# Patient Record
Sex: FEMALE | Race: WHITE | NOT HISPANIC OR LATINO | Employment: OTHER | URBAN - NONMETROPOLITAN AREA
[De-identification: names, ages, dates, MRNs, and addresses within clinical notes are randomized per-mention and may not be internally consistent; named-entity substitution may affect disease eponyms.]

---

## 2020-07-17 ENCOUNTER — TRANSCRIBE ORDERS (OUTPATIENT)
Dept: ADMINISTRATIVE | Facility: HOSPITAL | Age: 60
End: 2020-07-17

## 2020-07-17 DIAGNOSIS — Z01.818 PREOP TESTING: Primary | ICD-10-CM

## 2020-07-23 ENCOUNTER — LAB (OUTPATIENT)
Dept: LAB | Facility: HOSPITAL | Age: 60
End: 2020-07-23

## 2020-07-23 PROCEDURE — U0003 INFECTIOUS AGENT DETECTION BY NUCLEIC ACID (DNA OR RNA); SEVERE ACUTE RESPIRATORY SYNDROME CORONAVIRUS 2 (SARS-COV-2) (CORONAVIRUS DISEASE [COVID-19]), AMPLIFIED PROBE TECHNIQUE, MAKING USE OF HIGH THROUGHPUT TECHNOLOGIES AS DESCRIBED BY CMS-2020-01-R: HCPCS | Performed by: PAIN MEDICINE

## 2020-07-23 PROCEDURE — C9803 HOPD COVID-19 SPEC COLLECT: HCPCS | Performed by: PAIN MEDICINE

## 2020-07-24 LAB
COVID LABCORP PRIORITY: NORMAL
SARS-COV-2 RNA RESP QL NAA+PROBE: NOT DETECTED

## 2020-10-22 ENCOUNTER — TRANSCRIBE ORDERS (OUTPATIENT)
Dept: ADMINISTRATIVE | Facility: HOSPITAL | Age: 60
End: 2020-10-22

## 2020-10-22 DIAGNOSIS — Z01.818 PREOP TESTING: Primary | ICD-10-CM

## 2020-10-24 ENCOUNTER — LAB (OUTPATIENT)
Dept: LAB | Facility: HOSPITAL | Age: 60
End: 2020-10-24

## 2020-10-24 PROCEDURE — U0003 INFECTIOUS AGENT DETECTION BY NUCLEIC ACID (DNA OR RNA); SEVERE ACUTE RESPIRATORY SYNDROME CORONAVIRUS 2 (SARS-COV-2) (CORONAVIRUS DISEASE [COVID-19]), AMPLIFIED PROBE TECHNIQUE, MAKING USE OF HIGH THROUGHPUT TECHNOLOGIES AS DESCRIBED BY CMS-2020-01-R: HCPCS | Performed by: PAIN MEDICINE

## 2020-10-24 PROCEDURE — C9803 HOPD COVID-19 SPEC COLLECT: HCPCS | Performed by: PAIN MEDICINE

## 2020-10-25 LAB
COVID LABCORP PRIORITY: NORMAL
SARS-COV-2 RNA RESP QL NAA+PROBE: NOT DETECTED

## 2021-01-01 ENCOUNTER — HOSPITAL ENCOUNTER (INPATIENT)
Age: 61
LOS: 3 days | DRG: 137 | End: 2021-09-04
Attending: EMERGENCY MEDICINE | Admitting: INTERNAL MEDICINE
Payer: COMMERCIAL

## 2021-01-01 ENCOUNTER — APPOINTMENT (OUTPATIENT)
Dept: GENERAL RADIOLOGY | Age: 61
DRG: 137 | End: 2021-01-01
Payer: COMMERCIAL

## 2021-01-01 VITALS
SYSTOLIC BLOOD PRESSURE: 122 MMHG | OXYGEN SATURATION: 68 % | TEMPERATURE: 97.8 F | HEIGHT: 65 IN | BODY MASS INDEX: 48.82 KG/M2 | RESPIRATION RATE: 28 BRPM | WEIGHT: 293 LBS | HEART RATE: 125 BPM | DIASTOLIC BLOOD PRESSURE: 70 MMHG

## 2021-01-01 DIAGNOSIS — J96.01 ACUTE HYPOXEMIC RESPIRATORY FAILURE DUE TO SEVERE ACUTE RESPIRATORY SYNDROME CORONAVIRUS 2 (SARS-COV-2) DISEASE (HCC): Primary | ICD-10-CM

## 2021-01-01 DIAGNOSIS — E87.20 ACIDOSIS: ICD-10-CM

## 2021-01-01 DIAGNOSIS — J12.82 PNEUMONIA DUE TO COVID-19 VIRUS: ICD-10-CM

## 2021-01-01 DIAGNOSIS — U07.1 ACUTE HYPOXEMIC RESPIRATORY FAILURE DUE TO SEVERE ACUTE RESPIRATORY SYNDROME CORONAVIRUS 2 (SARS-COV-2) DISEASE (HCC): Primary | ICD-10-CM

## 2021-01-01 DIAGNOSIS — U07.1 PNEUMONIA DUE TO COVID-19 VIRUS: ICD-10-CM

## 2021-01-01 DIAGNOSIS — E66.9 OBESITY, UNSPECIFIED CLASSIFICATION, UNSPECIFIED OBESITY TYPE, UNSPECIFIED WHETHER SERIOUS COMORBIDITY PRESENT: ICD-10-CM

## 2021-01-01 LAB
ADENOVIRUS BY PCR: NOT DETECTED
ALBUMIN SERPL-MCNC: 2.8 G/DL (ref 3.5–5.2)
ALBUMIN SERPL-MCNC: 3 G/DL (ref 3.5–5.2)
ALBUMIN SERPL-MCNC: 3.1 G/DL (ref 3.5–5.2)
ALBUMIN SERPL-MCNC: 3.4 G/DL (ref 3.5–5.2)
ALP BLD-CCNC: 132 U/L (ref 35–104)
ALP BLD-CCNC: 134 U/L (ref 35–104)
ALP BLD-CCNC: 137 U/L (ref 35–104)
ALP BLD-CCNC: 139 U/L (ref 35–104)
ALT SERPL-CCNC: 37 U/L (ref 5–33)
ALT SERPL-CCNC: 55 U/L (ref 5–33)
ALT SERPL-CCNC: 57 U/L (ref 5–33)
ALT SERPL-CCNC: 73 U/L (ref 5–33)
ANION GAP SERPL CALCULATED.3IONS-SCNC: 12 MMOL/L (ref 7–19)
ANION GAP SERPL CALCULATED.3IONS-SCNC: 15 MMOL/L (ref 7–19)
ANION GAP SERPL CALCULATED.3IONS-SCNC: 19 MMOL/L (ref 7–19)
ANION GAP SERPL CALCULATED.3IONS-SCNC: 24 MMOL/L (ref 7–19)
ANISOCYTOSIS: ABNORMAL
ANISOCYTOSIS: ABNORMAL
AST SERPL-CCNC: 111 U/L (ref 5–32)
AST SERPL-CCNC: 124 U/L (ref 5–32)
AST SERPL-CCNC: 215 U/L (ref 5–32)
AST SERPL-CCNC: 237 U/L (ref 5–32)
BACTERIA: NEGATIVE /HPF
BANDED NEUTROPHILS RELATIVE PERCENT: 17 % (ref 0–5)
BASE EXCESS ARTERIAL: -13 MMOL/L (ref -2–2)
BASE EXCESS ARTERIAL: -13.4 MMOL/L (ref -2–2)
BASE EXCESS ARTERIAL: -13.6 MMOL/L (ref -2–2)
BASE EXCESS ARTERIAL: -2.7 MMOL/L (ref -2–2)
BASE EXCESS ARTERIAL: -4.8 MMOL/L (ref -2–2)
BASOPHILS ABSOLUTE: 0 K/UL (ref 0–0.2)
BASOPHILS RELATIVE PERCENT: 0 % (ref 0–1)
BASOPHILS RELATIVE PERCENT: 0.1 % (ref 0–1)
BILIRUB SERPL-MCNC: 0.3 MG/DL (ref 0.2–1.2)
BILIRUB SERPL-MCNC: 0.4 MG/DL (ref 0.2–1.2)
BILIRUB SERPL-MCNC: 0.4 MG/DL (ref 0.2–1.2)
BILIRUB SERPL-MCNC: 0.5 MG/DL (ref 0.2–1.2)
BILIRUBIN URINE: NEGATIVE
BLOOD, URINE: ABNORMAL
BORDETELLA PARAPERTUSSIS BY PCR: NOT DETECTED
BORDETELLA PERTUSSIS BY PCR: NOT DETECTED
BUN BLDV-MCNC: 12 MG/DL (ref 6–20)
BUN BLDV-MCNC: 20 MG/DL (ref 6–20)
BUN BLDV-MCNC: 21 MG/DL (ref 6–20)
BUN BLDV-MCNC: 26 MG/DL (ref 6–20)
BURR CELLS: ABNORMAL
BURR CELLS: ABNORMAL
C-REACTIVE PROTEIN: 13.92 MG/DL (ref 0–0.5)
C-REACTIVE PROTEIN: 23.99 MG/DL (ref 0–0.5)
C-REACTIVE PROTEIN: 26.02 MG/DL (ref 0–0.5)
C-REACTIVE PROTEIN: 6.03 MG/DL (ref 0–0.5)
CALCIUM SERPL-MCNC: 7.9 MG/DL (ref 8.6–10)
CALCIUM SERPL-MCNC: 8.3 MG/DL (ref 8.6–10)
CALCIUM SERPL-MCNC: 8.4 MG/DL (ref 8.6–10)
CALCIUM SERPL-MCNC: 8.6 MG/DL (ref 8.6–10)
CARBOXYHEMOGLOBIN ARTERIAL: 1.2 % (ref 0–5)
CARBOXYHEMOGLOBIN ARTERIAL: 1.5 % (ref 0–5)
CARBOXYHEMOGLOBIN ARTERIAL: 1.6 % (ref 0–5)
CARBOXYHEMOGLOBIN ARTERIAL: 2.2 % (ref 0–5)
CARBOXYHEMOGLOBIN ARTERIAL: 2.5 % (ref 0–5)
CHLAMYDOPHILIA PNEUMONIAE BY PCR: NOT DETECTED
CHLORIDE BLD-SCNC: 102 MMOL/L (ref 98–111)
CHLORIDE BLD-SCNC: 108 MMOL/L (ref 98–111)
CHLORIDE BLD-SCNC: 110 MMOL/L (ref 98–111)
CHLORIDE BLD-SCNC: 97 MMOL/L (ref 98–111)
CLARITY: CLEAR
CO2: 12 MMOL/L (ref 22–29)
CO2: 12 MMOL/L (ref 22–29)
CO2: 20 MMOL/L (ref 22–29)
CO2: 21 MMOL/L (ref 22–29)
COLOR: YELLOW
CORONAVIRUS 229E BY PCR: NOT DETECTED
CORONAVIRUS HKU1 BY PCR: NOT DETECTED
CORONAVIRUS NL63 BY PCR: NOT DETECTED
CORONAVIRUS OC43 BY PCR: NOT DETECTED
CREAT SERPL-MCNC: 0.8 MG/DL (ref 0.5–0.9)
CREAT SERPL-MCNC: 0.8 MG/DL (ref 0.5–0.9)
CREAT SERPL-MCNC: 0.9 MG/DL (ref 0.5–0.9)
CREAT SERPL-MCNC: 1 MG/DL (ref 0.5–0.9)
CRYSTALS, UA: ABNORMAL /HPF
D DIMER: 2.04 UG/ML FEU (ref 0–0.48)
D DIMER: 3.42 UG/ML FEU (ref 0–0.48)
D DIMER: 7.09 UG/ML FEU (ref 0–0.48)
D DIMER: 9.22 UG/ML FEU (ref 0–0.48)
EOSINOPHILS ABSOLUTE: 0 K/UL (ref 0–0.6)
EOSINOPHILS RELATIVE PERCENT: 0 % (ref 0–5)
EPITHELIAL CELLS, UA: 1 /HPF (ref 0–5)
FERRITIN: 647.4 NG/ML (ref 13–150)
FIBRINOGEN: 368 MG/DL (ref 238–505)
FIBRINOGEN: 531 MG/DL (ref 238–505)
FIBRINOGEN: 581 MG/DL (ref 238–505)
GFR AFRICAN AMERICAN: >59
GFR NON-AFRICAN AMERICAN: 57
GFR NON-AFRICAN AMERICAN: >60
GLUCOSE BLD-MCNC: 163 MG/DL (ref 70–99)
GLUCOSE BLD-MCNC: 168 MG/DL (ref 70–99)
GLUCOSE BLD-MCNC: 168 MG/DL (ref 70–99)
GLUCOSE BLD-MCNC: 171 MG/DL (ref 70–99)
GLUCOSE BLD-MCNC: 173 MG/DL (ref 70–99)
GLUCOSE BLD-MCNC: 188 MG/DL (ref 70–99)
GLUCOSE BLD-MCNC: 197 MG/DL (ref 70–99)
GLUCOSE BLD-MCNC: 198 MG/DL (ref 74–109)
GLUCOSE BLD-MCNC: 206 MG/DL (ref 74–109)
GLUCOSE BLD-MCNC: 215 MG/DL (ref 74–109)
GLUCOSE BLD-MCNC: 223 MG/DL (ref 70–99)
GLUCOSE BLD-MCNC: 249 MG/DL (ref 70–99)
GLUCOSE BLD-MCNC: 282 MG/DL (ref 74–109)
GLUCOSE URINE: =>1000 MG/DL
HCO3 ARTERIAL: 12.6 MMOL/L (ref 22–26)
HCO3 ARTERIAL: 15 MMOL/L (ref 22–26)
HCO3 ARTERIAL: 16 MMOL/L (ref 22–26)
HCO3 ARTERIAL: 22.1 MMOL/L (ref 22–26)
HCO3 ARTERIAL: 24.5 MMOL/L (ref 22–26)
HCT VFR BLD CALC: 42.9 % (ref 37–47)
HCT VFR BLD CALC: 44.2 % (ref 37–47)
HCT VFR BLD CALC: 45 % (ref 37–47)
HCT VFR BLD CALC: 45.7 % (ref 37–47)
HEMOGLOBIN, ART, EXTENDED: 13 G/DL (ref 12–16)
HEMOGLOBIN, ART, EXTENDED: 13 G/DL (ref 12–16)
HEMOGLOBIN, ART, EXTENDED: 13.3 G/DL (ref 12–16)
HEMOGLOBIN, ART, EXTENDED: 13.4 G/DL (ref 12–16)
HEMOGLOBIN, ART, EXTENDED: 14 G/DL (ref 12–16)
HEMOGLOBIN: 12.4 G/DL (ref 12–16)
HEMOGLOBIN: 12.9 G/DL (ref 12–16)
HEMOGLOBIN: 13 G/DL (ref 12–16)
HEMOGLOBIN: 13.2 G/DL (ref 12–16)
HUMAN METAPNEUMOVIRUS BY PCR: NOT DETECTED
HUMAN RHINOVIRUS/ENTEROVIRUS BY PCR: NOT DETECTED
HYALINE CASTS: 7 /HPF (ref 0–8)
HYPOCHROMIA: ABNORMAL
IMMATURE GRANULOCYTES #: 0.1 K/UL
INFLUENZA A BY PCR: NOT DETECTED
INFLUENZA B BY PCR: NOT DETECTED
INR BLD: 0.97 (ref 0.88–1.18)
INR BLD: 1.03 (ref 0.88–1.18)
INR BLD: 1.1 (ref 0.88–1.18)
INR BLD: 1.19 (ref 0.88–1.18)
KETONES, URINE: =>160 MG/DL
LACTIC ACID: 4.2 MMOL/L (ref 0.5–1.9)
LEUKOCYTE ESTERASE, URINE: NEGATIVE
LIPASE: 11 U/L (ref 13–60)
LV EF: 28 %
LVEF MODALITY: NORMAL
LYMPHOCYTES ABSOLUTE: 0.6 K/UL (ref 1.1–4.5)
LYMPHOCYTES ABSOLUTE: 0.7 K/UL (ref 1.1–4.5)
LYMPHOCYTES ABSOLUTE: 0.8 K/UL (ref 1.1–4.5)
LYMPHOCYTES ABSOLUTE: 1 K/UL (ref 1.1–4.5)
LYMPHOCYTES RELATIVE PERCENT: 12.2 % (ref 20–40)
LYMPHOCYTES RELATIVE PERCENT: 5 % (ref 20–40)
LYMPHOCYTES RELATIVE PERCENT: 5.5 % (ref 20–40)
LYMPHOCYTES RELATIVE PERCENT: 7.5 % (ref 20–40)
MACROCYTES: ABNORMAL
MAGNESIUM: 2.6 MG/DL (ref 1.6–2.6)
MAGNESIUM: 2.8 MG/DL (ref 1.6–2.6)
MCH RBC QN AUTO: 26.5 PG (ref 27–31)
MCH RBC QN AUTO: 26.6 PG (ref 27–31)
MCH RBC QN AUTO: 27.3 PG (ref 27–31)
MCH RBC QN AUTO: 27.4 PG (ref 27–31)
MCHC RBC AUTO-ENTMCNC: 28.7 G/DL (ref 33–37)
MCHC RBC AUTO-ENTMCNC: 28.9 G/DL (ref 33–37)
MCHC RBC AUTO-ENTMCNC: 28.9 G/DL (ref 33–37)
MCHC RBC AUTO-ENTMCNC: 29.4 G/DL (ref 33–37)
MCV RBC AUTO: 92.1 FL (ref 81–99)
MCV RBC AUTO: 92.4 FL (ref 81–99)
MCV RBC AUTO: 92.9 FL (ref 81–99)
MCV RBC AUTO: 95 FL (ref 81–99)
METHEMOGLOBIN ARTERIAL: 0.8 %
METHEMOGLOBIN ARTERIAL: 0.9 %
METHEMOGLOBIN ARTERIAL: 1 %
MONOCYTES ABSOLUTE: 0.3 K/UL (ref 0–0.9)
MONOCYTES ABSOLUTE: 0.4 K/UL (ref 0–0.9)
MONOCYTES ABSOLUTE: 0.5 K/UL (ref 0–0.9)
MONOCYTES ABSOLUTE: 0.5 K/UL (ref 0–0.9)
MONOCYTES RELATIVE PERCENT: 2 % (ref 0–10)
MONOCYTES RELATIVE PERCENT: 4.4 % (ref 0–10)
MONOCYTES RELATIVE PERCENT: 4.7 % (ref 0–10)
MONOCYTES RELATIVE PERCENT: 5 % (ref 0–10)
MYCOPLASMA PNEUMONIAE BY PCR: NOT DETECTED
NEUTROPHILS ABSOLUTE: 12.1 K/UL (ref 1.5–7.5)
NEUTROPHILS ABSOLUTE: 6.8 K/UL (ref 1.5–7.5)
NEUTROPHILS ABSOLUTE: 9.2 K/UL (ref 1.5–7.5)
NEUTROPHILS ABSOLUTE: 9.8 K/UL (ref 1.5–7.5)
NEUTROPHILS RELATIVE PERCENT: 76 % (ref 50–65)
NEUTROPHILS RELATIVE PERCENT: 82.1 % (ref 50–65)
NEUTROPHILS RELATIVE PERCENT: 87.2 % (ref 50–65)
NEUTROPHILS RELATIVE PERCENT: 88.4 % (ref 50–65)
NITRITE, URINE: NEGATIVE
O2 CONTENT ARTERIAL: 14.9 ML/DL
O2 CONTENT ARTERIAL: 15.2 ML/DL
O2 CONTENT ARTERIAL: 16.4 ML/DL
O2 CONTENT ARTERIAL: 16.6 ML/DL
O2 CONTENT ARTERIAL: 17.2 ML/DL
O2 SAT, ARTERIAL: 81.3 %
O2 SAT, ARTERIAL: 81.4 %
O2 SAT, ARTERIAL: 83.5 %
O2 SAT, ARTERIAL: 90.6 %
O2 SAT, ARTERIAL: 91.4 %
O2 THERAPY: ABNORMAL
OVALOCYTES: ABNORMAL
OVALOCYTES: ABNORMAL
PARAINFLUENZA VIRUS 1 BY PCR: NOT DETECTED
PARAINFLUENZA VIRUS 2 BY PCR: NOT DETECTED
PARAINFLUENZA VIRUS 3 BY PCR: NOT DETECTED
PARAINFLUENZA VIRUS 4 BY PCR: NOT DETECTED
PCO2 ARTERIAL: 30 MMHG (ref 35–45)
PCO2 ARTERIAL: 43 MMHG (ref 35–45)
PCO2 ARTERIAL: 47 MMHG (ref 35–45)
PCO2 ARTERIAL: 48 MMHG (ref 35–45)
PCO2 ARTERIAL: 51 MMHG (ref 35–45)
PDW BLD-RTO: 17.3 % (ref 11.5–14.5)
PDW BLD-RTO: 17.3 % (ref 11.5–14.5)
PDW BLD-RTO: 17.6 % (ref 11.5–14.5)
PDW BLD-RTO: 17.6 % (ref 11.5–14.5)
PERFORMED ON: ABNORMAL
PH ARTERIAL: 7.13 (ref 7.35–7.45)
PH ARTERIAL: 7.15 (ref 7.35–7.45)
PH ARTERIAL: 7.23 (ref 7.35–7.45)
PH ARTERIAL: 7.28 (ref 7.35–7.45)
PH ARTERIAL: 7.29 (ref 7.35–7.45)
PH UA: 5.5 (ref 5–8)
PLATELET # BLD: 158 K/UL (ref 130–400)
PLATELET # BLD: 160 K/UL (ref 130–400)
PLATELET # BLD: 168 K/UL (ref 130–400)
PLATELET # BLD: 168 K/UL (ref 130–400)
PLATELET SLIDE REVIEW: ADEQUATE
PMV BLD AUTO: 11.1 FL (ref 9.4–12.3)
PMV BLD AUTO: 11.5 FL (ref 9.4–12.3)
PMV BLD AUTO: 11.7 FL (ref 9.4–12.3)
PMV BLD AUTO: 11.9 FL (ref 9.4–12.3)
PO2 ARTERIAL: 50 MMHG (ref 80–100)
PO2 ARTERIAL: 51 MMHG (ref 80–100)
PO2 ARTERIAL: 55 MMHG (ref 80–100)
PO2 ARTERIAL: 65 MMHG (ref 80–100)
PO2 ARTERIAL: 65 MMHG (ref 80–100)
POLYCHROMASIA: ABNORMAL
POTASSIUM SERPL-SCNC: 4.2 MMOL/L (ref 3.5–5)
POTASSIUM SERPL-SCNC: 4.7 MMOL/L (ref 3.5–5)
POTASSIUM SERPL-SCNC: 4.7 MMOL/L (ref 3.5–5)
POTASSIUM SERPL-SCNC: 5 MMOL/L (ref 3.5–5)
POTASSIUM, WHOLE BLOOD: 4.1
POTASSIUM, WHOLE BLOOD: 4.3
POTASSIUM, WHOLE BLOOD: 4.4
POTASSIUM, WHOLE BLOOD: 4.6
POTASSIUM, WHOLE BLOOD: 4.8
PRO-BNP: 2669 PG/ML (ref 0–900)
PROCALCITONIN: 0.86 NG/ML (ref 0–0.09)
PROTEIN UA: 300 MG/DL
PROTHROMBIN TIME: 13.1 SEC (ref 12–14.6)
PROTHROMBIN TIME: 13.7 SEC (ref 12–14.6)
PROTHROMBIN TIME: 14.4 SEC (ref 12–14.6)
PROTHROMBIN TIME: 15.3 SEC (ref 12–14.6)
RBC # BLD: 4.66 M/UL (ref 4.2–5.4)
RBC # BLD: 4.76 M/UL (ref 4.2–5.4)
RBC # BLD: 4.81 M/UL (ref 4.2–5.4)
RBC # BLD: 4.87 M/UL (ref 4.2–5.4)
RBC UA: 4 /HPF (ref 0–4)
REASON FOR REJECTION: NORMAL
REJECTED TEST: NORMAL
RESPIRATORY SYNCYTIAL VIRUS BY PCR: NOT DETECTED
SARS-COV-2, PCR: DETECTED
SODIUM BLD-SCNC: 133 MMOL/L (ref 136–145)
SODIUM BLD-SCNC: 133 MMOL/L (ref 136–145)
SODIUM BLD-SCNC: 143 MMOL/L (ref 136–145)
SODIUM BLD-SCNC: 143 MMOL/L (ref 136–145)
SPECIFIC GRAVITY UA: 1.03 (ref 1–1.03)
TEAR DROP CELLS: ABNORMAL
TOTAL PROTEIN: 6.7 G/DL (ref 6.6–8.7)
TOTAL PROTEIN: 6.7 G/DL (ref 6.6–8.7)
TOTAL PROTEIN: 6.8 G/DL (ref 6.6–8.7)
TOTAL PROTEIN: 7.3 G/DL (ref 6.6–8.7)
TROPONIN: 0.36 NG/ML (ref 0–0.03)
UROBILINOGEN, URINE: 0.2 E.U./DL
WBC # BLD: 10.6 K/UL (ref 4.8–10.8)
WBC # BLD: 11.1 K/UL (ref 4.8–10.8)
WBC # BLD: 13 K/UL (ref 4.8–10.8)
WBC # BLD: 8.3 K/UL (ref 4.8–10.8)
WBC UA: 1 /HPF (ref 0–5)

## 2021-01-01 PROCEDURE — 6370000000 HC RX 637 (ALT 250 FOR IP): Performed by: INTERNAL MEDICINE

## 2021-01-01 PROCEDURE — 2580000003 HC RX 258: Performed by: INTERNAL MEDICINE

## 2021-01-01 PROCEDURE — 6360000002 HC RX W HCPCS: Performed by: INTERNAL MEDICINE

## 2021-01-01 PROCEDURE — 6360000002 HC RX W HCPCS: Performed by: EMERGENCY MEDICINE

## 2021-01-01 PROCEDURE — 02HV33Z INSERTION OF INFUSION DEVICE INTO SUPERIOR VENA CAVA, PERCUTANEOUS APPROACH: ICD-10-PCS | Performed by: INTERNAL MEDICINE

## 2021-01-01 PROCEDURE — 84132 ASSAY OF SERUM POTASSIUM: CPT

## 2021-01-01 PROCEDURE — 71045 X-RAY EXAM CHEST 1 VIEW: CPT

## 2021-01-01 PROCEDURE — 2500000003 HC RX 250 WO HCPCS: Performed by: INTERNAL MEDICINE

## 2021-01-01 PROCEDURE — 83880 ASSAY OF NATRIURETIC PEPTIDE: CPT

## 2021-01-01 PROCEDURE — 94002 VENT MGMT INPAT INIT DAY: CPT

## 2021-01-01 PROCEDURE — 85025 COMPLETE CBC W/AUTO DIFF WBC: CPT

## 2021-01-01 PROCEDURE — 2100000000 HC CCU R&B

## 2021-01-01 PROCEDURE — 36600 WITHDRAWAL OF ARTERIAL BLOOD: CPT

## 2021-01-01 PROCEDURE — 0BH17EZ INSERTION OF ENDOTRACHEAL AIRWAY INTO TRACHEA, VIA NATURAL OR ARTIFICIAL OPENING: ICD-10-PCS | Performed by: EMERGENCY MEDICINE

## 2021-01-01 PROCEDURE — 94003 VENT MGMT INPAT SUBQ DAY: CPT

## 2021-01-01 PROCEDURE — 2700000000 HC OXYGEN THERAPY PER DAY

## 2021-01-01 PROCEDURE — 2500000003 HC RX 250 WO HCPCS: Performed by: EMERGENCY MEDICINE

## 2021-01-01 PROCEDURE — 82728 ASSAY OF FERRITIN: CPT

## 2021-01-01 PROCEDURE — 82947 ASSAY GLUCOSE BLOOD QUANT: CPT

## 2021-01-01 PROCEDURE — 85379 FIBRIN DEGRADATION QUANT: CPT

## 2021-01-01 PROCEDURE — 85610 PROTHROMBIN TIME: CPT

## 2021-01-01 PROCEDURE — C1751 CATH, INF, PER/CENT/MIDLINE: HCPCS

## 2021-01-01 PROCEDURE — 96374 THER/PROPH/DIAG INJ IV PUSH: CPT

## 2021-01-01 PROCEDURE — 99284 EMERGENCY DEPT VISIT MOD MDM: CPT

## 2021-01-01 PROCEDURE — 80053 COMPREHEN METABOLIC PANEL: CPT

## 2021-01-01 PROCEDURE — 96365 THER/PROPH/DIAG IV INF INIT: CPT

## 2021-01-01 PROCEDURE — 2500000003 HC RX 250 WO HCPCS

## 2021-01-01 PROCEDURE — 6360000002 HC RX W HCPCS

## 2021-01-01 PROCEDURE — 85384 FIBRINOGEN ACTIVITY: CPT

## 2021-01-01 PROCEDURE — 31500 INSERT EMERGENCY AIRWAY: CPT

## 2021-01-01 PROCEDURE — 86140 C-REACTIVE PROTEIN: CPT

## 2021-01-01 PROCEDURE — C8929 TTE W OR WO FOL WCON,DOPPLER: HCPCS

## 2021-01-01 PROCEDURE — XW033H5 INTRODUCTION OF TOCILIZUMAB INTO PERIPHERAL VEIN, PERCUTANEOUS APPROACH, NEW TECHNOLOGY GROUP 5: ICD-10-PCS | Performed by: INTERNAL MEDICINE

## 2021-01-01 PROCEDURE — 83735 ASSAY OF MAGNESIUM: CPT

## 2021-01-01 PROCEDURE — 2580000003 HC RX 258: Performed by: EMERGENCY MEDICINE

## 2021-01-01 PROCEDURE — 36680 INSERT NEEDLE BONE CAVITY: CPT

## 2021-01-01 PROCEDURE — 0202U NFCT DS 22 TRGT SARS-COV-2: CPT

## 2021-01-01 PROCEDURE — 84145 PROCALCITONIN (PCT): CPT

## 2021-01-01 PROCEDURE — 36569 INSJ PICC 5 YR+ W/O IMAGING: CPT

## 2021-01-01 PROCEDURE — 83605 ASSAY OF LACTIC ACID: CPT

## 2021-01-01 PROCEDURE — 76937 US GUIDE VASCULAR ACCESS: CPT

## 2021-01-01 PROCEDURE — 83690 ASSAY OF LIPASE: CPT

## 2021-01-01 PROCEDURE — 81001 URINALYSIS AUTO W/SCOPE: CPT

## 2021-01-01 PROCEDURE — 82803 BLOOD GASES ANY COMBINATION: CPT

## 2021-01-01 PROCEDURE — 84484 ASSAY OF TROPONIN QUANT: CPT

## 2021-01-01 PROCEDURE — 87040 BLOOD CULTURE FOR BACTERIA: CPT

## 2021-01-01 PROCEDURE — 36415 COLL VENOUS BLD VENIPUNCTURE: CPT

## 2021-01-01 PROCEDURE — 5A1945Z RESPIRATORY VENTILATION, 24-96 CONSECUTIVE HOURS: ICD-10-PCS | Performed by: INTERNAL MEDICINE

## 2021-01-01 RX ORDER — PROPOFOL 10 MG/ML
5-50 INJECTION, EMULSION INTRAVENOUS
Status: DISCONTINUED | OUTPATIENT
Start: 2021-01-01 | End: 2021-01-01

## 2021-01-01 RX ORDER — MAGNESIUM SULFATE 1 G/100ML
1000 INJECTION INTRAVENOUS PRN
Status: DISCONTINUED | OUTPATIENT
Start: 2021-01-01 | End: 2021-01-01 | Stop reason: HOSPADM

## 2021-01-01 RX ORDER — MONTELUKAST SODIUM 10 MG/1
10 TABLET ORAL NIGHTLY
COMMUNITY

## 2021-01-01 RX ORDER — 0.9 % SODIUM CHLORIDE 0.9 %
15 INTRAVENOUS SOLUTION INTRAVENOUS ONCE
Status: DISCONTINUED | OUTPATIENT
Start: 2021-01-01 | End: 2021-01-01

## 2021-01-01 RX ORDER — FENTANYL CITRATE 50 UG/ML
INJECTION, SOLUTION INTRAMUSCULAR; INTRAVENOUS
Status: COMPLETED
Start: 2021-01-01 | End: 2021-01-01

## 2021-01-01 RX ORDER — VECURONIUM BROMIDE 1 MG/ML
10 INJECTION, POWDER, LYOPHILIZED, FOR SOLUTION INTRAVENOUS ONCE
Status: DISCONTINUED | OUTPATIENT
Start: 2021-01-01 | End: 2021-01-01 | Stop reason: HOSPADM

## 2021-01-01 RX ORDER — METOPROLOL TARTRATE 5 MG/5ML
5 INJECTION INTRAVENOUS ONCE
Status: DISCONTINUED | OUTPATIENT
Start: 2021-01-01 | End: 2021-01-01 | Stop reason: HOSPADM

## 2021-01-01 RX ORDER — ACETAMINOPHEN 325 MG/1
650 TABLET ORAL EVERY 6 HOURS PRN
Status: DISCONTINUED | OUTPATIENT
Start: 2021-01-01 | End: 2021-01-01 | Stop reason: HOSPADM

## 2021-01-01 RX ORDER — VITAMIN B COMPLEX
2000 TABLET ORAL DAILY
Status: DISCONTINUED | OUTPATIENT
Start: 2021-01-01 | End: 2021-01-01 | Stop reason: HOSPADM

## 2021-01-01 RX ORDER — MECOBALAMIN 5000 MCG
10 TABLET,DISINTEGRATING ORAL NIGHTLY
Status: DISCONTINUED | OUTPATIENT
Start: 2021-01-01 | End: 2021-01-01 | Stop reason: HOSPADM

## 2021-01-01 RX ORDER — ZOLPIDEM TARTRATE 10 MG/1
TABLET ORAL NIGHTLY PRN
COMMUNITY

## 2021-01-01 RX ORDER — DEXTROSE AND SODIUM CHLORIDE 5; .45 G/100ML; G/100ML
INJECTION, SOLUTION INTRAVENOUS CONTINUOUS PRN
Status: DISCONTINUED | OUTPATIENT
Start: 2021-01-01 | End: 2021-01-01 | Stop reason: HOSPADM

## 2021-01-01 RX ORDER — GABAPENTIN 800 MG/1
800 TABLET ORAL 3 TIMES DAILY
COMMUNITY

## 2021-01-01 RX ORDER — LISINOPRIL 5 MG/1
5 TABLET ORAL DAILY
COMMUNITY

## 2021-01-01 RX ORDER — DEXAMETHASONE SODIUM PHOSPHATE 10 MG/ML
6 INJECTION, SOLUTION INTRAMUSCULAR; INTRAVENOUS EVERY 12 HOURS
Status: DISCONTINUED | OUTPATIENT
Start: 2021-01-01 | End: 2021-01-01 | Stop reason: HOSPADM

## 2021-01-01 RX ORDER — SODIUM CHLORIDE 9 MG/ML
INJECTION, SOLUTION INTRAVENOUS CONTINUOUS
Status: DISCONTINUED | OUTPATIENT
Start: 2021-01-01 | End: 2021-01-01

## 2021-01-01 RX ORDER — LABETALOL HYDROCHLORIDE 5 MG/ML
10 INJECTION, SOLUTION INTRAVENOUS EVERY 4 HOURS PRN
Status: DISCONTINUED | OUTPATIENT
Start: 2021-01-01 | End: 2021-01-01 | Stop reason: SINTOL

## 2021-01-01 RX ORDER — EPINEPHRINE 0.1 MG/ML
SYRINGE (ML) INJECTION
Status: COMPLETED | OUTPATIENT
Start: 2021-01-01 | End: 2021-01-01

## 2021-01-01 RX ORDER — METOPROLOL TARTRATE 5 MG/5ML
5 INJECTION INTRAVENOUS EVERY 6 HOURS PRN
Status: DISCONTINUED | OUTPATIENT
Start: 2021-01-01 | End: 2021-01-01 | Stop reason: HOSPADM

## 2021-01-01 RX ORDER — FENTANYL CITRATE 50 UG/ML
50 INJECTION, SOLUTION INTRAMUSCULAR; INTRAVENOUS ONCE
Status: COMPLETED | OUTPATIENT
Start: 2021-01-01 | End: 2021-01-01

## 2021-01-01 RX ORDER — DEXAMETHASONE SODIUM PHOSPHATE 10 MG/ML
6 INJECTION, SOLUTION INTRAMUSCULAR; INTRAVENOUS EVERY 12 HOURS
Status: DISCONTINUED | OUTPATIENT
Start: 2021-01-01 | End: 2021-01-01

## 2021-01-01 RX ORDER — LORAZEPAM 1 MG/1
1 TABLET ORAL EVERY 6 HOURS PRN
COMMUNITY

## 2021-01-01 RX ORDER — TIZANIDINE 4 MG/1
4 TABLET ORAL 3 TIMES DAILY
COMMUNITY

## 2021-01-01 RX ORDER — DEXTROSE MONOHYDRATE 25 G/50ML
12.5 INJECTION, SOLUTION INTRAVENOUS PRN
Status: DISCONTINUED | OUTPATIENT
Start: 2021-01-01 | End: 2021-01-01 | Stop reason: HOSPADM

## 2021-01-01 RX ORDER — PROPOFOL 10 MG/ML
5-50 INJECTION, EMULSION INTRAVENOUS
Status: DISCONTINUED | OUTPATIENT
Start: 2021-01-01 | End: 2021-01-01 | Stop reason: HOSPADM

## 2021-01-01 RX ORDER — MIDAZOLAM HYDROCHLORIDE 1 MG/ML
INJECTION INTRAMUSCULAR; INTRAVENOUS
Status: COMPLETED
Start: 2021-01-01 | End: 2021-01-01

## 2021-01-01 RX ORDER — METOPROLOL TARTRATE 5 MG/5ML
INJECTION INTRAVENOUS
Status: COMPLETED
Start: 2021-01-01 | End: 2021-01-01

## 2021-01-01 RX ORDER — INSULIN GLARGINE 100 [IU]/ML
100 INJECTION, SOLUTION SUBCUTANEOUS NIGHTLY
COMMUNITY

## 2021-01-01 RX ORDER — GUAIFENESIN 200 MG/1
400 TABLET ORAL EVERY 8 HOURS
Status: DISCONTINUED | OUTPATIENT
Start: 2021-01-01 | End: 2021-01-01 | Stop reason: HOSPADM

## 2021-01-01 RX ORDER — FAMOTIDINE 20 MG/1
20 TABLET, FILM COATED ORAL 2 TIMES DAILY
COMMUNITY

## 2021-01-01 RX ORDER — DEXAMETHASONE SODIUM PHOSPHATE 10 MG/ML
6 INJECTION, SOLUTION INTRAMUSCULAR; INTRAVENOUS ONCE
Status: COMPLETED | OUTPATIENT
Start: 2021-01-01 | End: 2021-01-01

## 2021-01-01 RX ORDER — VECURONIUM BROMIDE 1 MG/ML
20 INJECTION, POWDER, LYOPHILIZED, FOR SOLUTION INTRAVENOUS EVERY 4 HOURS PRN
Status: DISCONTINUED | OUTPATIENT
Start: 2021-01-01 | End: 2021-01-01 | Stop reason: HOSPADM

## 2021-01-01 RX ORDER — EMPAGLIFLOZIN 25 MG/1
25 TABLET, FILM COATED ORAL DAILY
COMMUNITY

## 2021-01-01 RX ORDER — LIDOCAINE HYDROCHLORIDE 10 MG/ML
5 INJECTION, SOLUTION EPIDURAL; INFILTRATION; INTRACAUDAL; PERINEURAL ONCE
Status: DISCONTINUED | OUTPATIENT
Start: 2021-01-01 | End: 2021-01-01 | Stop reason: HOSPADM

## 2021-01-01 RX ORDER — SODIUM CHLORIDE 0.9 % (FLUSH) 0.9 %
5-40 SYRINGE (ML) INJECTION EVERY 12 HOURS SCHEDULED
Status: DISCONTINUED | OUTPATIENT
Start: 2021-01-01 | End: 2021-01-01 | Stop reason: HOSPADM

## 2021-01-01 RX ORDER — SODIUM CHLORIDE, SODIUM LACTATE, POTASSIUM CHLORIDE, CALCIUM CHLORIDE 600; 310; 30; 20 MG/100ML; MG/100ML; MG/100ML; MG/100ML
1000 INJECTION, SOLUTION INTRAVENOUS ONCE
Status: COMPLETED | OUTPATIENT
Start: 2021-01-01 | End: 2021-01-01

## 2021-01-01 RX ORDER — MIDAZOLAM IN NACL,ISO-OSMOT/PF 50 MG/50ML
1-10 INFUSION BOTTLE (ML) INTRAVENOUS CONTINUOUS
Status: DISCONTINUED | OUTPATIENT
Start: 2021-01-01 | End: 2021-01-01 | Stop reason: HOSPADM

## 2021-01-01 RX ORDER — METOPROLOL TARTRATE 5 MG/5ML
5 INJECTION INTRAVENOUS ONCE
Status: COMPLETED | OUTPATIENT
Start: 2021-01-01 | End: 2021-01-01

## 2021-01-01 RX ORDER — SODIUM CHLORIDE 9 MG/ML
25 INJECTION, SOLUTION INTRAVENOUS PRN
Status: DISCONTINUED | OUTPATIENT
Start: 2021-01-01 | End: 2021-01-01 | Stop reason: HOSPADM

## 2021-01-01 RX ORDER — SODIUM CHLORIDE, SODIUM LACTATE, POTASSIUM CHLORIDE, CALCIUM CHLORIDE 600; 310; 30; 20 MG/100ML; MG/100ML; MG/100ML; MG/100ML
INJECTION, SOLUTION INTRAVENOUS CONTINUOUS
Status: ACTIVE | OUTPATIENT
Start: 2021-01-01 | End: 2021-01-01

## 2021-01-01 RX ORDER — MIDAZOLAM IN NACL,ISO-OSMOT/PF 50 MG/50ML
INFUSION BOTTLE (ML) INTRAVENOUS
Status: COMPLETED
Start: 2021-01-01 | End: 2021-01-01

## 2021-01-01 RX ORDER — SODIUM CHLORIDE 0.9 % (FLUSH) 0.9 %
5-40 SYRINGE (ML) INJECTION PRN
Status: DISCONTINUED | OUTPATIENT
Start: 2021-01-01 | End: 2021-01-01 | Stop reason: HOSPADM

## 2021-01-01 RX ORDER — MIDAZOLAM HYDROCHLORIDE 1 MG/ML
2 INJECTION INTRAMUSCULAR; INTRAVENOUS ONCE
Status: COMPLETED | OUTPATIENT
Start: 2021-01-01 | End: 2021-01-01

## 2021-01-01 RX ORDER — VECURONIUM BROMIDE 1 MG/ML
10 INJECTION, POWDER, LYOPHILIZED, FOR SOLUTION INTRAVENOUS ONCE
Status: COMPLETED | OUTPATIENT
Start: 2021-01-01 | End: 2021-01-01

## 2021-01-01 RX ORDER — ATORVASTATIN CALCIUM 20 MG/1
20 TABLET, FILM COATED ORAL DAILY
COMMUNITY

## 2021-01-01 RX ORDER — ALBUTEROL SULFATE 0.63 MG/3ML
1 SOLUTION RESPIRATORY (INHALATION) EVERY 6 HOURS PRN
COMMUNITY

## 2021-01-01 RX ORDER — ZINC SULFATE 50(220)MG
50 CAPSULE ORAL DAILY
Status: DISCONTINUED | OUTPATIENT
Start: 2021-01-01 | End: 2021-01-01 | Stop reason: HOSPADM

## 2021-01-01 RX ORDER — POTASSIUM CHLORIDE 7.45 MG/ML
10 INJECTION INTRAVENOUS PRN
Status: DISCONTINUED | OUTPATIENT
Start: 2021-01-01 | End: 2021-01-01 | Stop reason: HOSPADM

## 2021-01-01 RX ADMIN — Medication 10 MG: at 21:39

## 2021-01-01 RX ADMIN — ENOXAPARIN SODIUM 30 MG: 30 INJECTION SUBCUTANEOUS at 19:29

## 2021-01-01 RX ADMIN — TOCILIZUMAB 810 MG: 180 INJECTION, SOLUTION SUBCUTANEOUS at 16:44

## 2021-01-01 RX ADMIN — METOPROLOL TARTRATE 5 MG: 5 INJECTION INTRAVENOUS at 08:11

## 2021-01-01 RX ADMIN — Medication 200 MCG/HR: at 02:51

## 2021-01-01 RX ADMIN — FENTANYL CITRATE 50 MCG: 50 INJECTION, SOLUTION INTRAMUSCULAR; INTRAVENOUS at 13:57

## 2021-01-01 RX ADMIN — PROPOFOL 35 MCG/KG/MIN: 10 INJECTION, EMULSION INTRAVENOUS at 07:31

## 2021-01-01 RX ADMIN — Medication 2000 UNITS: at 08:13

## 2021-01-01 RX ADMIN — Medication 50 MCG/HR: at 12:09

## 2021-01-01 RX ADMIN — GUAIFENESIN 400 MG: 200 TABLET ORAL at 21:39

## 2021-01-01 RX ADMIN — MIDAZOLAM 2 MG: 1 INJECTION INTRAMUSCULAR; INTRAVENOUS at 12:20

## 2021-01-01 RX ADMIN — FAMOTIDINE 20 MG: 10 INJECTION, SOLUTION INTRAVENOUS at 07:30

## 2021-01-01 RX ADMIN — SODIUM BICARBONATE 50 MEQ: 84 INJECTION, SOLUTION INTRAVENOUS at 13:59

## 2021-01-01 RX ADMIN — ENOXAPARIN SODIUM 30 MG: 30 INJECTION SUBCUTANEOUS at 19:50

## 2021-01-01 RX ADMIN — Medication 200 MCG/HR: at 09:31

## 2021-01-01 RX ADMIN — VECURONIUM BROMIDE 20 MG: 1 INJECTION, POWDER, LYOPHILIZED, FOR SOLUTION INTRAVENOUS at 19:52

## 2021-01-01 RX ADMIN — GUAIFENESIN 400 MG: 200 TABLET ORAL at 19:51

## 2021-01-01 RX ADMIN — Medication 175 MCG/HR: at 15:30

## 2021-01-01 RX ADMIN — SODIUM CHLORIDE, PRESERVATIVE FREE 10 ML: 5 INJECTION INTRAVENOUS at 23:44

## 2021-01-01 RX ADMIN — Medication 7 MG/HR: at 16:37

## 2021-01-01 RX ADMIN — Medication 200 MCG/HR: at 19:24

## 2021-01-01 RX ADMIN — Medication 200 MCG/HR: at 16:37

## 2021-01-01 RX ADMIN — DEXAMETHASONE SODIUM PHOSPHATE 6 MG: 10 INJECTION, SOLUTION INTRAMUSCULAR; INTRAVENOUS at 12:18

## 2021-01-01 RX ADMIN — METOPROLOL TARTRATE 5 MG: 5 INJECTION INTRAVENOUS at 06:00

## 2021-01-01 RX ADMIN — DEXAMETHASONE SODIUM PHOSPHATE 6 MG: 10 INJECTION, SOLUTION INTRAMUSCULAR; INTRAVENOUS at 23:36

## 2021-01-01 RX ADMIN — SODIUM CHLORIDE, PRESERVATIVE FREE 10 ML: 5 INJECTION INTRAVENOUS at 07:31

## 2021-01-01 RX ADMIN — Medication 7 MG/HR: at 00:54

## 2021-01-01 RX ADMIN — PROPOFOL 35 MCG/KG/MIN: 10 INJECTION, EMULSION INTRAVENOUS at 15:35

## 2021-01-01 RX ADMIN — MIDAZOLAM HYDROCHLORIDE 2 MG: 1 INJECTION INTRAMUSCULAR; INTRAVENOUS at 13:58

## 2021-01-01 RX ADMIN — VECURONIUM BROMIDE 10 MG: 1 INJECTION, POWDER, LYOPHILIZED, FOR SOLUTION INTRAVENOUS at 14:13

## 2021-01-01 RX ADMIN — FENTANYL CITRATE 100 MCG: 50 INJECTION INTRAMUSCULAR; INTRAVENOUS at 12:20

## 2021-01-01 RX ADMIN — VECURONIUM BROMIDE 20 MG: 1 INJECTION, POWDER, LYOPHILIZED, FOR SOLUTION INTRAVENOUS at 20:42

## 2021-01-01 RX ADMIN — DEXAMETHASONE SODIUM PHOSPHATE 6 MG: 10 INJECTION, SOLUTION INTRAMUSCULAR; INTRAVENOUS at 12:25

## 2021-01-01 RX ADMIN — GUAIFENESIN 400 MG: 200 TABLET ORAL at 03:49

## 2021-01-01 RX ADMIN — SODIUM CHLORIDE, SODIUM LACTATE, POTASSIUM CHLORIDE, AND CALCIUM CHLORIDE 1000 ML: 600; 310; 30; 20 INJECTION, SOLUTION INTRAVENOUS at 12:40

## 2021-01-01 RX ADMIN — Medication 10 MG: at 19:50

## 2021-01-01 RX ADMIN — DEXAMETHASONE SODIUM PHOSPHATE 6 MG: 10 INJECTION, SOLUTION INTRAMUSCULAR; INTRAVENOUS at 11:47

## 2021-01-01 RX ADMIN — Medication 0.1 MG: at 17:47

## 2021-01-01 RX ADMIN — GUAIFENESIN 400 MG: 200 TABLET ORAL at 04:04

## 2021-01-01 RX ADMIN — DEXAMETHASONE SODIUM PHOSPHATE 6 MG: 10 INJECTION, SOLUTION INTRAMUSCULAR; INTRAVENOUS at 23:43

## 2021-01-01 RX ADMIN — VECURONIUM BROMIDE 20 MG: 1 INJECTION, POWDER, LYOPHILIZED, FOR SOLUTION INTRAVENOUS at 12:05

## 2021-01-01 RX ADMIN — PROPOFOL 50 MCG/KG/MIN: 10 INJECTION, EMULSION INTRAVENOUS at 17:23

## 2021-01-01 RX ADMIN — VECURONIUM BROMIDE 20 MG: 1 INJECTION, POWDER, LYOPHILIZED, FOR SOLUTION INTRAVENOUS at 01:49

## 2021-01-01 RX ADMIN — ZINC SULFATE 220 MG (50 MG) CAPSULE 50 MG: CAPSULE at 08:13

## 2021-01-01 RX ADMIN — Medication 100 MG: at 12:34

## 2021-01-01 RX ADMIN — Medication 10 MG: at 19:29

## 2021-01-01 RX ADMIN — PROPOFOL 40 MCG/KG/MIN: 10 INJECTION, EMULSION INTRAVENOUS at 19:24

## 2021-01-01 RX ADMIN — PROPOFOL 40 MCG/KG/MIN: 10 INJECTION, EMULSION INTRAVENOUS at 22:59

## 2021-01-01 RX ADMIN — DEXAMETHASONE SODIUM PHOSPHATE 6 MG: 10 INJECTION, SOLUTION INTRAMUSCULAR; INTRAVENOUS at 00:22

## 2021-01-01 RX ADMIN — MIDAZOLAM 2 MG: 1 INJECTION INTRAMUSCULAR; INTRAVENOUS at 13:40

## 2021-01-01 RX ADMIN — PROPOFOL 30 MCG/KG/MIN: 10 INJECTION, EMULSION INTRAVENOUS at 03:45

## 2021-01-01 RX ADMIN — Medication 200 MCG/HR: at 05:59

## 2021-01-01 RX ADMIN — GUAIFENESIN 400 MG: 200 TABLET ORAL at 03:46

## 2021-01-01 RX ADMIN — ENOXAPARIN SODIUM 30 MG: 30 INJECTION SUBCUTANEOUS at 08:13

## 2021-01-01 RX ADMIN — ZINC SULFATE 220 MG (50 MG) CAPSULE 50 MG: CAPSULE at 07:30

## 2021-01-01 RX ADMIN — PROPOFOL 40 MCG/KG/MIN: 10 INJECTION, EMULSION INTRAVENOUS at 09:03

## 2021-01-01 RX ADMIN — SODIUM CHLORIDE, PRESERVATIVE FREE 10 ML: 5 INJECTION INTRAVENOUS at 20:44

## 2021-01-01 RX ADMIN — ZINC SULFATE 220 MG (50 MG) CAPSULE 50 MG: CAPSULE at 08:45

## 2021-01-01 RX ADMIN — PROPOFOL 40 MCG/KG/MIN: 10 INJECTION, EMULSION INTRAVENOUS at 05:56

## 2021-01-01 RX ADMIN — ENOXAPARIN SODIUM 30 MG: 30 INJECTION SUBCUTANEOUS at 07:30

## 2021-01-01 RX ADMIN — Medication 2000 UNITS: at 21:44

## 2021-01-01 RX ADMIN — FAMOTIDINE 20 MG: 10 INJECTION, SOLUTION INTRAVENOUS at 08:13

## 2021-01-01 RX ADMIN — FAMOTIDINE 20 MG: 10 INJECTION, SOLUTION INTRAVENOUS at 21:38

## 2021-01-01 RX ADMIN — VECURONIUM BROMIDE 20 MG: 1 INJECTION, POWDER, LYOPHILIZED, FOR SOLUTION INTRAVENOUS at 21:52

## 2021-01-01 RX ADMIN — PROPOFOL 40 MCG/KG/MIN: 10 INJECTION, EMULSION INTRAVENOUS at 23:43

## 2021-01-01 RX ADMIN — PROPOFOL 40 MCG/KG/MIN: 10 INJECTION, EMULSION INTRAVENOUS at 19:50

## 2021-01-01 RX ADMIN — MIDAZOLAM 2 MG: 1 INJECTION INTRAMUSCULAR; INTRAVENOUS at 12:11

## 2021-01-01 RX ADMIN — Medication 0.1 MG: at 17:45

## 2021-01-01 RX ADMIN — MIDAZOLAM HYDROCHLORIDE 2 MG: 1 INJECTION INTRAMUSCULAR; INTRAVENOUS at 13:40

## 2021-01-01 RX ADMIN — Medication 8 MG/HR: at 02:09

## 2021-01-01 RX ADMIN — FENTANYL CITRATE 100 MCG: 50 INJECTION, SOLUTION INTRAMUSCULAR; INTRAVENOUS at 12:20

## 2021-01-01 RX ADMIN — VECURONIUM BROMIDE 20 MG: 1 INJECTION, POWDER, LYOPHILIZED, FOR SOLUTION INTRAVENOUS at 16:22

## 2021-01-01 RX ADMIN — Medication 2000 UNITS: at 07:30

## 2021-01-01 RX ADMIN — SODIUM BICARBONATE 150 MEQ: 84 INJECTION, SOLUTION INTRAVENOUS at 04:05

## 2021-01-01 RX ADMIN — PROPOFOL 40 MCG/KG/MIN: 10 INJECTION, EMULSION INTRAVENOUS at 03:09

## 2021-01-01 RX ADMIN — FAMOTIDINE 20 MG: 10 INJECTION, SOLUTION INTRAVENOUS at 08:45

## 2021-01-01 RX ADMIN — MIDAZOLAM 2 MG: 1 INJECTION INTRAMUSCULAR; INTRAVENOUS at 13:58

## 2021-01-01 RX ADMIN — PROPOFOL 30 MCG/KG/MIN: 10 INJECTION, EMULSION INTRAVENOUS at 23:35

## 2021-01-01 RX ADMIN — Medication 6 MG/HR: at 09:26

## 2021-01-01 RX ADMIN — PROPOFOL 40 MCG/KG/MIN: 10 INJECTION, EMULSION INTRAVENOUS at 03:20

## 2021-01-01 RX ADMIN — PROPOFOL 50 MCG/KG/MIN: 10 INJECTION, EMULSION INTRAVENOUS at 12:23

## 2021-01-01 RX ADMIN — GUAIFENESIN 400 MG: 200 TABLET ORAL at 19:29

## 2021-01-01 RX ADMIN — VECURONIUM BROMIDE 20 MG: 1 INJECTION, POWDER, LYOPHILIZED, FOR SOLUTION INTRAVENOUS at 04:31

## 2021-01-01 RX ADMIN — INSULIN LISPRO 4 UNITS: 100 INJECTION, SOLUTION INTRAVENOUS; SUBCUTANEOUS at 09:45

## 2021-01-01 RX ADMIN — ENOXAPARIN SODIUM 30 MG: 30 INJECTION SUBCUTANEOUS at 16:00

## 2021-01-01 RX ADMIN — FAMOTIDINE 20 MG: 10 INJECTION, SOLUTION INTRAVENOUS at 19:50

## 2021-01-01 RX ADMIN — Medication 2000 UNITS: at 08:45

## 2021-01-01 RX ADMIN — SODIUM CHLORIDE, PRESERVATIVE FREE 10 ML: 5 INJECTION INTRAVENOUS at 08:45

## 2021-01-01 RX ADMIN — ZINC SULFATE 220 MG (50 MG) CAPSULE 50 MG: CAPSULE at 21:44

## 2021-01-01 RX ADMIN — PROPOFOL 40 MCG/KG/MIN: 10 INJECTION, EMULSION INTRAVENOUS at 15:45

## 2021-01-01 RX ADMIN — PROPOFOL 50 MCG/KG/MIN: 10 INJECTION, EMULSION INTRAVENOUS at 09:35

## 2021-01-01 RX ADMIN — VECURONIUM BROMIDE 20 MG: 1 INJECTION, POWDER, LYOPHILIZED, FOR SOLUTION INTRAVENOUS at 08:13

## 2021-01-01 RX ADMIN — Medication 10 MG: at 17:28

## 2021-01-01 RX ADMIN — FAMOTIDINE 20 MG: 10 INJECTION, SOLUTION INTRAVENOUS at 19:29

## 2021-01-01 RX ADMIN — PROPOFOL 35 MCG/KG/MIN: 10 INJECTION, EMULSION INTRAVENOUS at 11:43

## 2021-01-01 RX ADMIN — GUAIFENESIN 400 MG: 200 TABLET ORAL at 13:53

## 2021-01-01 RX ADMIN — ENOXAPARIN SODIUM 30 MG: 30 INJECTION SUBCUTANEOUS at 21:39

## 2021-01-01 RX ADMIN — PROPOFOL 40 MCG/KG/MIN: 10 INJECTION, EMULSION INTRAVENOUS at 13:13

## 2021-01-01 RX ADMIN — GUAIFENESIN 400 MG: 200 TABLET ORAL at 13:13

## 2021-01-01 ASSESSMENT — PAIN SCALES - GENERAL
PAINLEVEL_OUTOF10: 0
PAINLEVEL_OUTOF10: 4
PAINLEVEL_OUTOF10: 4
PAINLEVEL_OUTOF10: 0

## 2021-01-01 ASSESSMENT — PULMONARY FUNCTION TESTS
PIF_VALUE: 37
PIF_VALUE: 42
PIF_VALUE: 54
PIF_VALUE: 48
PIF_VALUE: 41
PIF_VALUE: 37
PIF_VALUE: 70
PIF_VALUE: 35
PIF_VALUE: 48
PIF_VALUE: 41
PIF_VALUE: 36
PIF_VALUE: 37
PIF_VALUE: 39
PIF_VALUE: 36
PIF_VALUE: 53
PIF_VALUE: 44
PIF_VALUE: 36
PIF_VALUE: 58
PIF_VALUE: 39
PIF_VALUE: 42
PIF_VALUE: 41
PIF_VALUE: 39
PIF_VALUE: 37
PIF_VALUE: 40
PIF_VALUE: 38
PIF_VALUE: 36
PIF_VALUE: 42
PIF_VALUE: 69
PIF_VALUE: 38
PIF_VALUE: 37
PIF_VALUE: 36
PIF_VALUE: 40
PIF_VALUE: 37
PIF_VALUE: 42
PIF_VALUE: 42
PIF_VALUE: 43
PIF_VALUE: 41
PIF_VALUE: 37
PIF_VALUE: 40
PIF_VALUE: 42
PIF_VALUE: 37
PIF_VALUE: 42
PIF_VALUE: 34
PIF_VALUE: 37
PIF_VALUE: 47
PIF_VALUE: 39
PIF_VALUE: 42
PIF_VALUE: 40
PIF_VALUE: 37
PIF_VALUE: 36
PIF_VALUE: 36
PIF_VALUE: 42
PIF_VALUE: 39
PIF_VALUE: 41
PIF_VALUE: 39
PIF_VALUE: 45
PIF_VALUE: 37
PIF_VALUE: 38
PIF_VALUE: 38
PIF_VALUE: 39
PIF_VALUE: 42

## 2021-01-26 ENCOUNTER — TRANSCRIBE ORDERS (OUTPATIENT)
Dept: ADMINISTRATIVE | Facility: HOSPITAL | Age: 61
End: 2021-01-26

## 2021-01-26 DIAGNOSIS — Z01.818 PREOP TESTING: Primary | ICD-10-CM

## 2021-01-28 ENCOUNTER — LAB (OUTPATIENT)
Dept: LAB | Facility: HOSPITAL | Age: 61
End: 2021-01-28

## 2021-01-28 LAB — SARS-COV-2 ORF1AB RESP QL NAA+PROBE: NOT DETECTED

## 2021-01-28 PROCEDURE — U0004 COV-19 TEST NON-CDC HGH THRU: HCPCS | Performed by: PAIN MEDICINE

## 2021-01-28 PROCEDURE — C9803 HOPD COVID-19 SPEC COLLECT: HCPCS | Performed by: PAIN MEDICINE

## 2021-04-29 ENCOUNTER — TRANSCRIBE ORDERS (OUTPATIENT)
Dept: ADMINISTRATIVE | Facility: HOSPITAL | Age: 61
End: 2021-04-29

## 2021-04-29 DIAGNOSIS — Z01.818 PREOP TESTING: Primary | ICD-10-CM

## 2021-05-01 ENCOUNTER — LAB (OUTPATIENT)
Dept: LAB | Facility: HOSPITAL | Age: 61
End: 2021-05-01

## 2021-05-01 PROCEDURE — C9803 HOPD COVID-19 SPEC COLLECT: HCPCS | Performed by: PAIN MEDICINE

## 2021-05-01 PROCEDURE — U0004 COV-19 TEST NON-CDC HGH THRU: HCPCS | Performed by: PAIN MEDICINE

## 2021-05-02 LAB — SARS-COV-2 ORF1AB RESP QL NAA+PROBE: NOT DETECTED

## 2021-08-04 ENCOUNTER — TRANSCRIBE ORDERS (OUTPATIENT)
Dept: LAB | Facility: HOSPITAL | Age: 61
End: 2021-08-04

## 2021-08-04 DIAGNOSIS — Z01.818 PREOPERATIVE TESTING: Primary | ICD-10-CM

## 2021-08-06 ENCOUNTER — LAB (OUTPATIENT)
Dept: LAB | Facility: HOSPITAL | Age: 61
End: 2021-08-06

## 2021-08-06 LAB — SARS-COV-2 ORF1AB RESP QL NAA+PROBE: NOT DETECTED

## 2021-08-06 PROCEDURE — C9803 HOPD COVID-19 SPEC COLLECT: HCPCS | Performed by: PAIN MEDICINE

## 2021-08-06 PROCEDURE — U0004 COV-19 TEST NON-CDC HGH THRU: HCPCS | Performed by: PAIN MEDICINE

## 2021-09-01 PROBLEM — U07.1 PNEUMONIA DUE TO COVID-19 VIRUS: Status: ACTIVE | Noted: 2021-01-01

## 2021-09-01 PROBLEM — J12.82 PNEUMONIA DUE TO COVID-19 VIRUS: Status: ACTIVE | Noted: 2021-01-01

## 2021-09-01 NOTE — ED NOTES
Pt daughter Georges Carcamo arrives to ER and placed in consultation room. Yohana was updated and advised ER MD Kaden Moser would provide further updates.      Reid Snyder RN  09/01/21 3451

## 2021-09-01 NOTE — PROGRESS NOTES
BLOOD GAS, ARTERIAL [[id number]] (Abnormal) Collected: 09/01/21 1332      Specimen: Blood gases Updated: 09/01/21 1338      pH, Arterial 7.130      pCO2, Arterial 48.0 mmHg       pO2, Arterial 55.0 mmHg       HCO3, Arterial 16.0 mmol/L       Base Excess, Arterial -13.0 mmol/L       Hemoglobin, Art, Extended 13.0 g/dL       O2 Sat, Arterial 81.3 %       Carboxyhgb, Arterial 2.2 %       Methemoglobin, Arterial 1.0 %       O2 Content, Arterial 14.9 mL/dL       O2 Therapy Unknown     Narrative:       CALL  KEN Olivier RN, 09/01/2021 13:38, by Sharon Hassan     Potassium, Whole Blood [[id number]] Collected: 09/01/21 1332      Updated: 09/01/21 1333      Potassium, Whole Blood 4.4     AT+, R rad, Vent = 600, ACVC, FiO2 = 100%, PEEP = 15, RR = 16

## 2021-09-01 NOTE — PROGRESS NOTES
Both daughter christina and spouse Danice Hodgkins notified of pt coding they will discuss code status. michele aceves

## 2021-09-01 NOTE — PROGRESS NOTES
Pt intubated with a #7.5 eube at 23 cm at lip per Dr. Holden Lawrence. Pt placed on  rate of 16 100% and 15 peep. CXR and Abg's to follow.

## 2021-09-01 NOTE — PROGRESS NOTES
The Pharmacist should review the patient information to make sure criteria for use is met prior to dispensing. If the documentation does not support the criteria, the pharmacist should call the MD to verify the criteria is met. It is preferred that the provider is ID, intensivist, or pulmonary specialist, however the pharmacist can use clinical judgement as to the appropriateness of the order.     [facility] Tocilizumab Criteria for Use    Criteria **All criteria need to be met to receive Tocilizumab for COVID-19 patients**   Age  >22 years old    Clinical Status  Within 7 days of symptom onset OR within 2 days of hospital admission   If requiring initiation of vital organ (respiratory/cardiovascular) support, must start tocilizumab within 24 hours of initiation*   Concomitant Therapy Receiving systemic corticosteroids    Oxygen Saturation  <92% OR requiring oxygen    Laboratory Results  Positive COVID-19 test within 72 hours of admission  CRP >7.5 mg/dL   (>75mg/L)   Contraindications Invasive active mycobacterial or fungal infection  Platelets < 825,689 or active bleeding   Not receiving systemic steroids   Significant immunosuppression   Ordering Provider Type  ID, intensivists, pulmonology (where available)     *Respiratory support includes but is not limited to: Non-invasive/invasive ventilation   *Cardiovascular support includes but is not limited to: Vasopressor support       Weight-Based Tocilizumab Dose (x 1 dose only)  Patient Weight (kg) Tocilizumab (Actemra) Dose   <= 40 kg 8 mg/kg  x 1 dose   >40 kg - <= 65 kg 400 mg x 1 dose   > 65 - <=90 kg 600 mg  x 1 dose   > 90 kg  800mg  x 1 dose        Alternative Therapies if Tocilizumab Unavailable   Dexamethasone (PO/IV) 20mg daily taper   Methylprednisolone 125mg IV daily taper      Electronically signed by Jannet Sanders RPh, MITRA, 9/1/2021,3:43 PM

## 2021-09-01 NOTE — ED PROVIDER NOTES
[facility]  eMERGENCY dEPARTMENT eNCOUnter      Pt Name: Alexia Edmonds  MRN: [id number]  [place] 1960  Date of evaluation: 9/1/2021  Provider: Zee Quezada MD    [address] [place] [address]       Chief Complaint   Patient presents with    Shortness of Breath    Positive For Covid-19     tested + Fri         HISTORY OF PRESENT ILLNESS   (Location/Symptom, Timing/Onset,Context/Setting, Quality, Duration, Modifying Factors, Severity)  Note limiting factors. Alexia Edmonds is a 64 y.o. female who presents to the emergency department with shortness of breath. The patient arrives in extremis on BiPAP from [facility]. For tested Friday for Covid she became ill last Wednesday about. The patient was around grandchildren who got Covid at school. The patient has not been vaccinated for Covid. The patient presents with profound shortness of breath she had received a DuoNeb treatment was on BiPAP for [facility] initially they found her saturation at 37% O2 on room air. Patient was tachypneic mildly altered and in extremis. She presents to the ER with little else known. She is awake she is able to tell me she wants everything done. For her and family she has been sick for 2 to 3 days severely. Per report she [place] in [place] on Friday. The history is provided by the patient, the EMS personnel and a relative. The history is limited by the condition of the patient. NursingNotes were reviewed. REVIEW OF SYSTEMS    (2-9 systems for level 4, 10 or more for level 5)     Review of Systems   Unable to perform ROS: Severe respiratory distress       A complete review of systems was performed and is negative except as noted above in the HPI. PAST MEDICAL HISTORY   No past medical history on file. SURGICAL HISTORY     No past surgical history on file.       CURRENT MEDICATIONS       Previous Medications    No medications on file (136.1 kg)       Physical Exam  Vitals and nursing note reviewed. Constitutional:       General: She is in acute distress. Appearance: She is obese. She is ill-appearing and toxic-appearing. Comments: Sitting up in bed pale tachypneic respiratory rate 44 O2 saturation on BiPAP for [facility] only in the 62s. Morbidly obese able to speak in short words. HENT:      Head: Normocephalic and atraumatic. Eyes:      Extraocular Movements: Extraocular movements intact. Pupils: Pupils are equal, round, and reactive to light. Cardiovascular:      Rate and Rhythm: Regular rhythm. Tachycardia present. Comments: 133  Pulmonary:      Effort: Respiratory distress present. Breath sounds: Rhonchi and rales present. Comments: Tachypnea severe respiratory distress  Abdominal:      General: Abdomen is flat. Palpations: Abdomen is soft. Tenderness: There is no abdominal tenderness. Musculoskeletal:         General: No tenderness or deformity. Cervical back: Normal range of motion and neck supple. Skin:     General: Skin is warm and dry. Neurological:      General: No focal deficit present. Mental Status: She is alert. Comments: Able to move all 4 extremities she is able to tell me her name she seems slow to answer questions secondary to respiratory distress.   She cannot really answer questions fully secondary to her extremitas   Psychiatric:      Comments: Anxious         DIAGNOSTIC RESULTS     EKG: All EKG's are interpreted by the Emergency Department Physician who either signs or Co-signs this chart in the absence of a cardiologist.    EKG sinus tach rate 145 Q waves anteriorly low voltage  ms    RADIOLOGY:   Non-plain film images such as CT, Ultrasound and MRI are read by the radiologist. Plainradiographic images are visualized and preliminarily interpreted by the emergency physician with the below findings:        Interpretation per the Radiologist below, if available at the time of this note:    XR CHEST PORTABLE   Final Result   1. Endotracheal tube is satisfactorily positioned. 2. Extensive bilateral airspace filling infiltrates consistent with   pneumonia. Signed by Dr Rashawn Maddox   Final Result   1. Diffuse infiltrate throughout both lungs which pneumonia is favored   over edema.    Signed by Dr Lea Haynes    (Results Pending)         ED BEDSIDE ULTRASOUND:   Performed by ED Physician - none    LABS:  Labs Reviewed   RESPIRATORY PANEL, MOLECULAR, WITH COVID-19 - Abnormal; Notable for the following components:       Result Value    SARS-CoV-2, PCR DETECTED (*)     All other components within normal limits    Narrative:     Anthony Vallecillobury tel. ,  called result to Brittany Beasley RN ED, 09/01/2021 14:13, by Priyank Villa   BLOOD GAS, ARTERIAL - Abnormal; Notable for the following components:    pH, Arterial 7.230 (*)     pCO2, Arterial 30.0 (*)     pO2, Arterial 51.0 (*)     HCO3, Arterial 12.6 (*)     Base Excess, Arterial -13.6 (*)     O2 Sat, Arterial 81.4 (*)     All other components within normal limits    Narrative:     Rebecca Villanueva MD ER, 09/01/2021 11:52, by Linda Sin   COMPREHENSIVE METABOLIC PANEL - Abnormal; Notable for the following components:    Sodium 133 (*)     Chloride 97 (*)     CO2 12 (*)     Anion Gap 24 (*)     Glucose 198 (*)     Calcium 8.3 (*)     Albumin 3.4 (*)     Alkaline Phosphatase 132 (*)     ALT 37 (*)      (*)     All other components within normal limits   CBC WITH AUTO DIFFERENTIAL - Abnormal; Notable for the following components:    MCH 26.6 (*)     MCHC 28.9 (*)     RDW 17.6 (*)     Neutrophils % 82.1 (*)     Lymphocytes % 12.2 (*)     Lymphocytes Absolute 1.0 (*)     Polychromasia 1+ (*)     Hypochromia 1+ (*)     All other components within normal limits   LIPASE - Abnormal; Notable for the following components:    Lipase 11 (*) Madison Bueno MD ER, 09/01/2021 11:52, by Nimo Mccullough   POTASSIUM, WHOLE BLOOD   PROCALCITONIN   BASIC METABOLIC PANEL   BASIC METABOLIC PANEL   MAGNESIUM   MAGNESIUM   PHOSPHORUS   PHOSPHORUS   BASIC METABOLIC PANEL   MAGNESIUM   PHOSPHORUS   C-REACTIVE PROTEIN   POCT GLUCOSE   POCT GLUCOSE   POCT GLUCOSE   POCT GLUCOSE   POCT GLUCOSE   POCT GLUCOSE   POCT GLUCOSE   POCT GLUCOSE   POCT GLUCOSE   POCT GLUCOSE   POCT GLUCOSE   POCT GLUCOSE   POCT GLUCOSE       All other labs were within normal range or not returned as of this dictation. EMERGENCY DEPARTMENT COURSE and DIFFERENTIALDIAGNOSIS/MDM:   Vitals:    Vitals:    09/01/21 1340 09/01/21 1342 09/01/21 1402 09/01/21 1412   BP:  130/84 124/82 (!) 129/93   Pulse:       Resp:       Temp:       SpO2: (!) 82% (!) 85% (!) 83% (!) 84%   Weight:           MDM  Number of Diagnoses or Management Options  Acidosis: new and requires workup  Acute hypoxemic respiratory failure due to severe acute respiratory syndrome coronavirus 2 (SARS-CoV-2) disease ([facility]): new and requires workup  Obesity, unspecified classification, unspecified obesity type, unspecified whether serious comorbidity present: new and requires workup  Diagnosis management comments: Family states that patient has many medical problems I do not have an accurate list to put in diagnoses at this time. Patient arrives in extremis is obvious that she is not doing well even on BiPAP after breathing treatment she is profoundly hypoxic chest x-ray was shot showing pretty much white out on both sides of her lungs from likely Covid pneumonia. I discussed with the patient she was awake enough to understand and interact with me she required intubation or she is going to die. Unfortunately her Covid is so severe she may die in any event being intubated however I told her that she needs to be intubated as her work of breathing and her distress is too profound to stay on the BiPAP.   Her O2 saturation on BiPAP the best I could get was up in the 80s with a respiratory rate in the 40s or 50s with her starting to have altered mental status. Patient underwent RSI. Please see procedure note this was done without difficulty the patient's O2 saturation dropped precipitously within 20 seconds of trying to intubate her. The intubation was done on first attempt without difficulty. The patient is difficult to ventilate on the ventilator. I cannot get her oxygenation level up above 90%. The patient's vent settings have been changed I did paralyze her to try and help with asynchrony. The patient's blood gas was reassessed on repeat I increased her tidal volume I did give her 1 amp of bicarb she is on fentanyl and Versed drip she is required bolusing as well for fentanyl and Versed. The patient appears comfortable on the ventilator in the ER. I reassessed the patient multiple times I spoken to her daughter who is in the ER. I told her the dire nature of the situation and the high likelihood of death. Family has come to the ER to see the patient as well. The patient's chest x-ray shows good placement of the endotracheal tube with profound pneumonia. The patient is also acidotic I did give her 1 L of fluid. I discussed with Dr. Tona Klein the intensivist.  The patient is stable as I can make her in the ER with hypoxia while intubated with profound severe respiratory failure secondary to Covid pneumonia. I did send a Covid test myself to confirm. The patient will be admitted to Hospitalist Dr Tona Klein all discussed. She has frothy chf like edema on intubation in tube. I will give 1 L IVF but overloading her treating her lactate for viral sepsis is not ideal.    She will fluff out more. Decadron given. Discussed with hospitalist, lactate certainly secondary to hypoxemia for days. Critical with high likelihood for death sadly based on presentation.         Amount and/or Complexity of Data Reviewed  Clinical lab tests: ordered and reviewed  Tests in the radiology section of CPT®: ordered and reviewed  Obtain history from someone other than the patient: yes  Discuss the patient with other providers: yes  Independent visualization of images, tracings, or specimens: yes    Risk of Complications, Morbidity, and/or Mortality  Presenting problems: high  Management options: high    Critical Care  Total time providing critical care: 30-74 minutes    Patient Progress  Patient progress: (critical )    CRITICAL CARE TIME   Total Critical Care time was 60 minutes, excluding separately reportable procedures. There was a high probability of clinically significant/life threatening deterioration in the patient's condition which required my urgent intervention. CONSULTS:  None    PROCEDURES:  Unless otherwise notedbelow, none     Intubation    Date/Time: 9/1/2021 12:01 PM  Performed by: Sierra Coppola MD  Authorized by: Sierra Coppola MD     Consent:     Consent obtained:  Emergent situation    Consent given by:  Patient  Pre-procedure details:     Patient status:  Altered mental status    Mallampati score:  II    Pretreatment meds: etomidate. Paralytics:  Succinylcholine  Procedure details:     Preoxygenation:  BiPAP    CPR in progress: no      Intubation method:  Oral    Oral intubation technique:  Direct    Laryngoscope blade:   Mac 4    Tube size (mm):  7.5    Tube type:  Cuffed    Number of attempts:  1    Ventilation between attempts: no      Cricoid pressure: yes      Tube visualized through cords: yes    Placement assessment:     ETT to lip:  22    Tube secured with:  ETT oropeza    Breath sounds:  Equal    Placement verification: chest rise, CXR verification, equal breath sounds, ETCO2 detector and tube exhalation      Placement verification comment:  Frothy pink sputum on intubation in tube    CXR findings:  ETT in proper place  Comments:      Intubated without issue, sats 87 on bipap 30L when starting intubation    Quick intubation sats fell to 62 in < 1 minute    Started on vent         FINAL IMPRESSION     1. Acute hypoxemic respiratory failure due to severe acute respiratory syndrome coronavirus 2 (SARS-CoV-2) disease (HCC)    2. Obesity, unspecified classification, unspecified obesity type, unspecified whether serious comorbidity present    3. Acidosis          DISPOSITION/PLAN   DISPOSITION  Admit to ICU      PATIENT REFERRED TO:  No follow-up provider specified.     DISCHARGE MEDICATIONS:  New Prescriptions    No medications on file          (Please note that portions of this note were completed with a voice recognition program.  Efforts were made to edit the dictations butoccasionally words are mis-transcribed.)    Swapna Chapin MD (electronically signed)  AttendingEmergency Physician         Swapna Chapin MD  09/01/21 0007

## 2021-09-01 NOTE — PROGRESS NOTES
Contains critical data BLOOD GAS, ARTERIAL  Status:  Final result   Ref Range & Units 09/01/21 1151   pH, Arterial 7.350 - 7.450 7.230Low Panic     pCO2, Arterial 35.0 - 45.0 mmHg 30. 0Low     pO2, Arterial 80.0 - 100.0 mmHg 51. 0Low     HCO3, Arterial 22.0 - 26.0 mmol/L 12.6Low     Base Excess, Arterial -2.0 - 2.0 mmol/L -13. 6Low     Hemoglobin, Art, Extended 12.0 - 16.0 g/dL 13.3    O2 Sat, Arterial >92 % 81.4Low Panic     Carboxyhgb, Arterial 0.0 - 5.0 % 2.5    Comment:      0.0-1.5   (Smokers 1.5-5.0)    Methemoglobin, Arterial <1.5 % 1.0    O2 Content, Arterial Not Established mL/dL 15.2    O2 Therapy  Unknown    Resulting Agency  [facility]   Narrative    Mary Smith MD ER, 09/01/2021 11:52, by HERMELINDA        Specimen Collected: 09/01/21 1151 Last Resulted: 09/01/21 1151 View Other Order Details        Pt on bipap14/7 with aBUR 16 and 30 lpm O@  Site RR at+

## 2021-09-02 PROBLEM — Z51.5 PALLIATIVE CARE PATIENT: Status: ACTIVE | Noted: 2021-01-01

## 2021-09-02 NOTE — ACP (ADVANCE CARE PLANNING)
Advance Care Planning     Advance Care Planning Activator (Inpatient)  Conversation Note      Date of ACP Conversation: 2021     Conversation Conducted with: Pt's spouse:  Isha Mas    ACP Activator: Nelida Halsted, RN        Health Care Decision Maker:     Current Designated Health Care Decision Maker:     Primary Decision Maker: Sudha Contrersa - Child - [phone]    Primary Decision Maker: Joan Bain - Spouse - [phone]    Care Preferences    Ventilation: \"If you were in your present state of health and suddenly became very ill and were unable to breathe on your own, what would your preference be about the use of a ventilator (breathing machine) if it were available to you? \"      Would the patient desire the use of ventilator (breathing machine)?:  YES          Resuscitation  \"CPR works best to restart the heart when there is a sudden event, like a heart attack, in someone who is otherwise healthy. Unfortunately, CPR does not typically restart the heart for people who have serious health conditions or who are very sick. \"    \"In the event your heart stopped as a result of an underlying serious health condition, would you want attempts to be made to restart your heart (answer \"yes\" for attempt to resuscitate) or would you prefer a natural death (answer \"no\" for do not attempt to resuscitate)? \"  YES          Conversation Outcomes:  [x] ACP discussion completed

## 2021-09-02 NOTE — PROGRESS NOTES
Hospitalist Progress Note  [facility]     Patient: Julien Arteaga  : 1960  MRN: [id number]  Code Status: Full Code    Hospital Day: 1   Date of Service: 2021    Subjective:   Patient seen in [place] [address] critical care unit. Intubated and sedated. Past Medical History:   Diagnosis Date    Age related osteoporosis     Diabetes ([address]     HTN (hypertension)     Palliative care patient 2021       No past surgical history on file. No family history on file. Social History     Socioeconomic History    Marital status:      Spouse name: Not on file    Number of children: Not on file    Years of education: Not on file    Highest education level: Not on file   Occupational History    Not on file   Tobacco Use    Smoking status: Not on file   Substance and Sexual Activity    Alcohol use: Not on file    Drug use: Not on file    Sexual activity: Not on file   Other Topics Concern    Not on file   Social History Narrative    Not on file     Social Determinants of Health     Financial Resource Strain:     Difficulty of Paying Living Expenses:    Food Insecurity:     Worried About Running Out of Food in the Last Year:     [address] in the Last Year:    Transportation Needs:     Lack of Transportation (Medical):      Lack of Transportation (Non-Medical):    Physical Activity:     Days of Exercise per Week:     Minutes of Exercise per Session:    Stress:     Feeling of Stress :    Social Connections:     Frequency of Communication with Friends and Family:     Frequency of Social Gatherings with Friends and Family:     Attends [employer] Services:     Active Member of Clubs or Organizations:     Attends Club or Organization Meetings:     Marital Status:    Intimate Partner Violence:     Fear of Current or Ex-Partner:     Emotionally Abused:     Physically Abused:     Sexually Abused:        Current Facility-Administered Medications   Medication Dose Route Frequency Provider Last Rate Last Admin    insulin lispro (HUMALOG) injection vial 0-12 Units  0-12 Units SubCUTAneous TID WC Leigha Tran MD        insulin lispro (HUMALOG) injection vial 0-6 Units  0-6 Units SubCUTAneous Nightly Leigha Tran MD        sterile water injection             fentanyl (SUBLIMAZE) infusion 10 mcg/mL  12.5-200 mcg/hr IntraVENous Continuous Sergio Delgadillo MD 20 mL/hr at 09/02/21 0251 200 mcg/hr at 09/02/21 0251    acetaminophen (TYLENOL) tablet 650 mg  650 mg Oral Q6H PRN Alessandra Ayala MD        enoxaparin (LOVENOX) injection 30 mg  30 mg SubCUTAneous BID Alessandra Ayala MD   30 mg at 09/01/21 2139    famotidine (PEPCID) injection 20 mg  20 mg IntraVENous BID Alessandra Ayala MD   20 mg at 09/02/21 0845    guaiFENesin tablet 400 mg  400 mg Oral Q8H Alessandra Ayala MD   400 mg at 09/02/21 1313    melatonin disintegrating tablet 10 mg  10 mg Oral Nightly Alessandra Ayala MD   10 mg at 09/01/21 2139    Vitamin D (CHOLECALCIFEROL) tablet 2,000 Units  2,000 Units Oral Daily Alessandra Ayala MD   2,000 Units at 09/02/21 0845    zinc sulfate (ZINCATE) capsule 50 mg  50 mg Oral Daily Alessandra Ayala MD   50 mg at 09/02/21 0845    dextrose 50 % IV solution  12.5 g IntraVENous PRN Alessandra Ayala MD        potassium chloride 10 mEq/100 mL IVPB (Peripheral Line)  10 mEq IntraVENous PRN Alessandra Ayala MD        magnesium sulfate 1000 mg in dextrose 5% 100 mL IVPB  1,000 mg IntraVENous PRN Alessandra Ayala MD        sodium phosphate 10 mmol in dextrose 5 % 250 mL IVPB  10 mmol IntraVENous PRN Alessandra Ayala MD        Or    sodium phosphate 15 mmol in dextrose 5 % 250 mL IVPB  15 mmol IntraVENous PRN Alessandra Ayala MD        Or    sodium phosphate 20 mmol in dextrose 5 % 500 mL IVPB  20 mmol IntraVENous PRN Alessandra Ayala MD        dextrose 5 % and 0.45 % sodium chloride infusion   IntraVENous Continuous PRN Alessandra Ayala MD        vecuronium (NORCURON) injection 10 mg  10 mg IntraVENous Once Harvey Cruz MD        midazolam (VERSED) infusion 100mg/100mL  1-10 mg/hr IntraVENous Continuous Harvey Cruz MD 7 mL/hr at 09/02/21 0054 7 mg/hr at 09/02/21 0054    dexamethasone (PF) (DECADRON) injection 6 mg  6 mg IntraVENous Q12H Gabi Forbes MD   6 mg at 09/02/21 1218    propofol injection  5-50 mcg/kg/min IntraVENous Titrated Gabi Forbes MD 34.8 mL/hr at 09/02/21 1313 40 mcg/kg/min at 09/02/21 1313    labetalol (NORMODYNE;TRANDATE) injection 10 mg  10 mg IntraVENous Q4H PRN Gabi Forbes MD   10 mg at 09/01/21 1728    vecuronium (NORCURON) injection 20 mg  20 mg IntraVENous Q4H PRN Estefanía Gonzalez MD   20 mg at 09/02/21 1205    lidocaine PF 1 % injection 5 mL  5 mL IntraDERmal Once Gabi Forbes MD        sodium chloride flush 0.9 % injection 5-40 mL  5-40 mL IntraVENous 2 times per day Gabi Forbes MD   10 mL at 09/02/21 0845    sodium chloride flush 0.9 % injection 5-40 mL  5-40 mL IntraVENous PRN Gabi Forbes MD        0.9 % sodium chloride infusion  25 mL IntraVENous PRN Gabi Forbes MD             fentaNYL 200 mcg/hr (09/02/21 0251)    dextrose 5 % and 0.45 % NaCl      midazolam 7 mg/hr (09/02/21 0054)    propofol 40 mcg/kg/min (09/02/21 1313)    sodium chloride          Objective:   /76   Pulse 115   Temp 97.5 °F (36.4 °C) (Axillary)   Resp 24   Ht 5' 5\" (1.651 m)   Wt (!) 324 lb (147 kg)   SpO2 94%   BMI 53.92 kg/m²     In an effort to reduce COVID-19 exposure, patient seen from doorway and case discussed in detail with unit staff    Recent Labs     09/01/21  1142   WBC 8.3   RBC 4.66   HGB 12.4   HCT 42.9   MCV 92.1   MCH 26.6*   MCHC 28.9*        Recent Labs     09/01/21  1142 09/01/21  1151 09/01/21  1332 09/02/21  0030 09/02/21  0032   *  --   --  133*  --    K 4.2   < > 4.4 4.7 4.3   ANIONGAP 24*  --   --  19  --    CL 97*  --   --  102  --    CO2 12*  --   --  12*  --    BUN 12  --   -- 20  --    CREATININE 0.9  --   --  1.0*  --    GLUCOSE 198*  --   --  206*  --    CALCIUM 8.3*  --   --  7.9*  --     < > = values in this interval not displayed. No results for input(s): MG, PHOS in the last 72 hours. Recent Labs     09/01/21  1142 09/02/21  0030   * 237*   ALT 37* 57*   BILITOT 0.5 0.4   ALKPHOS 132* 137*     No results for input(s): PH, PO2, PCO2, HCO3, BE, O2SAT in the last 72 hours. Recent Labs     09/01/21  1142   TROPONINI 0.36*     Recent Labs     09/01/21  1142   INR 1.19*     Recent Labs     09/01/21  1142   LACTA 4.2*         Intake/Output Summary (Last 24 hours) at 9/2/2021 1316  Last data filed at 9/2/2021 1000  Gross per 24 hour   Intake 2169.16 ml   Output 1800 ml   Net 369.16 ml       XR CHEST PORTABLE    Result Date: 9/1/2021  EXAMINATION: Chest one view 9/1/2021 HISTORY: Covid FINDINGS: Today's exam is compared to previous study of earlier the same day. There is significant progression in the patient's bilateral pneumonia when compared to the previous exam of earlier the same day. An NG tube has been advanced into the stomach. An endotracheal tube remains well-positioned. . Worsening diffuse bilateral pneumonia. Signed by Dr Carol Barclay    Result Date: 9/1/2021  EXAMINATION: XR CHEST PORTABLE 9/1/2021 1:43 PM HISTORY: XR CHEST PORTABLE 9/1/2021 12:08 PM HISTORY: Cough COMPARISON: Prior exam the same date 11:44 AM. FINDINGS: Extensive bilateral airspace infiltrates are present. This most consistent with pneumonia. . Cardiac silhouettes upper limits of normal. Endotracheal tube is present. . The osseous structures and surrounding soft tissues demonstrate no acute abnormality. 1. Endotracheal tube is satisfactorily positioned. 2. Extensive bilateral airspace filling infiltrates consistent with pneumonia. Signed by Dr Ziggy Stout    XR CHEST PORTABLE    Result Date: 9/1/2021  XR CHEST PORTABLE 9/1/2021 12:58 PM History: Shortness of breath. Diffuse dense alveolar infiltrate throughout both lungs. Heart size appears to be within normal limits. No pneumothorax. 1. Diffuse infiltrate throughout both lungs which pneumonia is favored over edema.  Signed by Dr Armando Brian and Plan:   COVID-19 bilateral pneumonia/severe ARDS  Prone ventilation  Dexamethasone  Tocilizumab  Adjuvant therapy  Conservative fluid management  Paralytics as warranted  Unvaccinated     Ventilator dependent acute hypoxemic respiratory failure  Secondary to above  Lung protective ventilation  Titrate minute ventilation for pH 7.35  Follow ABG/CXR  Follow plateau pressure     Cardiac arrest  Suspect secondary to primary process  TTE once able given prone ventilation  Further imaging as warranted     Concern for DKA  DKA protocol     Morbid obesity     DVT prophylaxis  Lovenox    Total critical care time: 53 minutes    Tong Royal MD   9/2/2021 1:16 PM

## 2021-09-02 NOTE — CONSULTS
Initiated for support, goals of care and ACP. 64year old lady who presented to ED from [address] [place] [address]. She had tested positive for COVID last Friday along with other family members. She was intubated on arrival and admitted to CCU. Pt coded later in the evening yesterday. Her  was allowed to come up and see her. This morning we called to support family and review wishes. Spoke with her  Danice Hodgkins. He is thankful for visit and states, \"if that was my last time to see her, it was the best 30 min of my life. \"  He as spouse, and pt's oldest daughter Dora Garibay will be decision makers for pt. He communicated if pt were to code again, they would want resuscitation attempted once. But if she continued to decline, they may elect comfort measures. Active listening as  talked about their family and relationship. Emotional support extended. Palliative Care will continue to follow pt and support family.     Electronically signed by Eloy Rojas RN on 9/2/2021 at 12:06 PM

## 2021-09-02 NOTE — PLAN OF CARE
Nutrition Problem #1: Inadequate oral intake  Intervention: Food and/or Nutrient Delivery: Continue NPO  Nutritional Goals: Meet nutritional needs through alternate source of nutrition

## 2021-09-02 NOTE — PROGRESS NOTES
Pt flipped back supine from prone with assist of 5 RN's and 1 RT. PRN paralytic given prior to repositioning. IO access to Right tibia remains secure, site dry with no drainage; transparent dressing intact. O2 sat currently 96% with vent settings unchanged;    FiO2 100%    PEEP 15    Rate 24    TVolume 500    Vitals stable at this time. Will continue to monitor.      Electronically signed by Nilay Yusuf RN on 9/2/2021 at 12:35 PM

## 2021-09-02 NOTE — H&P
[address]    Patient: Mayte Godoy   : 1960   MRN: [id number]  Code Status: Full Code  PCP: No primary care provider on file. Date of Service: 2021    Chief Complaint:   Shortness of breath    History of Present Illness:   21-year-old female with past medical history as listed below presented to ER with shortness of breath. Patient developed symptoms of COVID-19 last Wednesday and subsequently tested positive Friday. Unvaccinated. Exposed to her grandchildren who got COVID-23 at school. No further history available. Review of Systems:   Review of systems not obtained due to patient factors. Past Medical History:     Past Medical History:   Diagnosis Date    Age related osteoporosis     Diabetes ([address]     HTN (hypertension)        Past Surgical History:   No past surgical history on file. Family History:   No family history on file. Social History:     Social History     Socioeconomic History    Marital status:      Spouse name: Not on file    Number of children: Not on file    Years of education: Not on file    Highest education level: Not on file   Occupational History    Not on file   Tobacco Use    Smoking status: Not on file   Substance and Sexual Activity    Alcohol use: Not on file    Drug use: Not on file    Sexual activity: Not on file   Other Topics Concern    Not on file   Social History Narrative    Not on file     Social Determinants of Health     Financial Resource Strain:     Difficulty of Paying Living Expenses:    Food Insecurity:     Worried About Running Out of Food in the Last Year:     [address] in the Last Year:    Transportation Needs:     Lack of Transportation (Medical):      Lack of Transportation (Non-Medical):    Physical Activity:     Days of Exercise per Week:     Minutes of Exercise per Session:    Stress:     Feeling of Stress :    Social Connections:     Frequency of Communication with Friends and Family:     Frequency of Social Gatherings with Friends and Family:     Attends L[employer] Services:     Active Member of Clubs or Organizations:     Attends Club or Organization Meetings:     Marital Status:    Intimate Partner Violence:     Fear of Current or Ex-Partner:     Emotionally Abused:     Physically Abused:     Sexually Abused:        Prior to Admission Medications:   No medications prior to admission. Allergies:      Allergies   Allergen Reactions    Latex Hives    Penicillins Hives         Physical Exam:   /80   Pulse 110   Temp 98.3 °F (36.8 °C)   Resp 25   Wt (!) 320 lb 1.6 oz (145.2 kg)   SpO2 (!) 81%     In an effort to reduce COVID-19 exposure, patient seen from doorway and case discussed in detail with unit staff     Recent Results (from the past 72 hour(s))   Comprehensive Metabolic Panel    Collection Time: 09/01/21 11:42 AM   Result Value Ref Range    Sodium 133 (L) 136 - 145 mmol/L    Potassium 4.2 3.5 - 5.0 mmol/L    Chloride 97 (L) 98 - 111 mmol/L    CO2 12 (L) 22 - 29 mmol/L    Anion Gap 24 (H) 7 - 19 mmol/L    Glucose 198 (H) 74 - 109 mg/dL    BUN 12 6 - 20 mg/dL    CREATININE 0.9 0.5 - 0.9 mg/dL    GFR Non-African American >60 >60    GFR African American >59 >59    Calcium 8.3 (L) 8.6 - 10.0 mg/dL    Total Protein 7.3 6.6 - 8.7 g/dL    Albumin 3.4 (L) 3.5 - 5.2 g/dL    Total Bilirubin 0.5 0.2 - 1.2 mg/dL    Alkaline Phosphatase 132 (H) 35 - 104 U/L    ALT 37 (H) 5 - 33 U/L     (H) 5 - 32 U/L   CBC Auto Differential    Collection Time: 09/01/21 11:42 AM   Result Value Ref Range    WBC 8.3 4.8 - 10.8 K/uL    RBC 4.66 4.20 - 5.40 M/uL    Hemoglobin 12.4 12.0 - 16.0 g/dL    Hematocrit 42.9 37.0 - 47.0 %    MCV 92.1 81.0 - 99.0 fL    MCH 26.6 (L) 27.0 - 31.0 pg    MCHC 28.9 (L) 33.0 - 37.0 g/dL    RDW 17.6 (H) 11.5 - 14.5 %    Platelets 660 650 - 641 K/uL    MPV 11.9 9.4 - 12.3 fL    PLATELET SLIDE REVIEW Adequate Neutrophils % 82.1 (H) 50.0 - 65.0 %    Lymphocytes % 12.2 (L) 20.0 - 40.0 %    Monocytes % 5.0 0.0 - 10.0 %    Eosinophils % 0.0 0.0 - 5.0 %    Basophils % 0.1 0.0 - 1.0 %    Neutrophils Absolute 6.8 1.5 - 7.5 K/uL    Immature Granulocytes # 0.1 K/uL    Lymphocytes Absolute 1.0 (L) 1.1 - 4.5 K/uL    Monocytes Absolute 0.40 0.00 - 0.90 K/uL    Eosinophils Absolute 0.00 0.00 - 0.60 K/uL    Basophils Absolute 0.00 0.00 - 0.20 K/uL    Polychromasia 1+ (A)     Hypochromia 1+ (A)    Lipase    Collection Time: 09/01/21 11:42 AM   Result Value Ref Range    Lipase 11 (L) 13 - 60 U/L   Protime-INR    Collection Time: 09/01/21 11:42 AM   Result Value Ref Range    Protime 15.3 (H) 12.0 - 14.6 sec    INR 1.19 (H) 0.88 - 1.18   Troponin    Collection Time: 09/01/21 11:42 AM   Result Value Ref Range    Troponin 0.36 (HH) 0.00 - 0.03 ng/mL   Brain Natriuretic Peptide    Collection Time: 09/01/21 11:42 AM   Result Value Ref Range    Pro-BNP 2,669 (H) 0 - 900 pg/mL   Lactic Acid, Plasma    Collection Time: 09/01/21 11:42 AM   Result Value Ref Range    Lactic Acid 4.2 (HH) 0.5 - 1.9 mmol/L   PROCALCITONIN    Collection Time: 09/01/21 11:42 AM   Result Value Ref Range    Procalcitonin 0.86 (H) 0.00 - 0.09 ng/mL   Ferritin    Collection Time: 09/01/21 11:42 AM   Result Value Ref Range    Ferritin 647.4 (H) 13.0 - 150.0 ng/mL   D-Dimer, Quantitative    Collection Time: 09/01/21 11:42 AM   Result Value Ref Range    D-Dimer, Quant 2.04 (H) 0.00 - 0.48 ug/mL FEU   C-REACTIVE PROTEIN    Collection Time: 09/01/21 11:42 AM   Result Value Ref Range    CRP 23.99 (H) 0.00 - 0.50 mg/dL   BLOOD GAS, ARTERIAL    Collection Time: 09/01/21 11:51 AM   Result Value Ref Range    pH, Arterial 7.230 (LL) 7.350 - 7.450    pCO2, Arterial 30.0 (L) 35.0 - 45.0 mmHg    pO2, Arterial 51.0 (L) 80.0 - 100.0 mmHg    HCO3, Arterial 12.6 (L) 22.0 - 26.0 mmol/L    Base Excess, Arterial -13.6 (L) -2.0 - 2.0 mmol/L    Hemoglobin, Art, Extended 13.3 12.0 - 16.0 g/dL O2 Sat, Arterial 81.4 (LL) >92 %    Carboxyhgb, Arterial 2.5 0.0 - 5.0 %    Methemoglobin, Arterial 1.0 <1.5 %    O2 Content, Arterial 15.2 Not Established mL/dL    O2 Therapy Unknown    Potassium, Whole Blood    Collection Time: 09/01/21 11:51 AM   Result Value Ref Range    Potassium, Whole Blood 4.1    Respiratory Panel, Molecular, with COVID-19 (Restricted: peds pts or suitable admitted adults)    Collection Time: 09/01/21 12:15 PM    Specimen: Nasopharyngeal   Result Value Ref Range    Adenovirus by PCR Not Detected Not Detected    Bordetella parapertussis by PCR Not Detected Not Detected    Bordetella pertussis by PCR Not Detected Not Detected    Chlamydophilia pneumoniae by PCR Not Detected Not Detected    Coronavirus 229E by PCR Not Detected Not Detected    Coronavirus HKU1 by PCR Not Detected Not Detected    Coronavirus NL63 by PCR Not Detected Not Detected    Coronavirus OC43 by PCR Not Detected Not Detected    SARS-CoV-2, PCR DETECTED (AA) Not Detected    Human Metapneumovirus by PCR Not Detected Not Detected    Human Rhinovirus/Enterovirus by PCR Not Detected Not Detected    Influenza A by PCR Not Detected Not Detected    Influenza B by PCR Not Detected Not Detected    Mycoplasma pneumoniae by PCR Not Detected Not Detected    Parainfluenza Virus 1 by PCR Not Detected Not Detected    Parainfluenza Virus 2 by PCR Not Detected Not Detected    Parainfluenza Virus 3 by PCR Not Detected Not Detected    Parainfluenza Virus 4 by PCR Not Detected Not Detected    Respiratory Syncytial Virus by PCR Not Detected Not Detected   Urinalysis Reflex to Culture    Collection Time: 09/01/21 12:28 PM    Specimen: Urine, indwelling catheter   Result Value Ref Range    Color, UA YELLOW Straw/Yellow    Clarity, UA Clear Clear    Glucose, Ur =>1000 Negative mg/dL    Bilirubin Urine Negative Negative    Ketones, Urine =>160 Negative mg/dL    Specific Gravity, UA 1.027 1.005 - 1.030    Blood, Urine MODERATE (A) Negative    pH, UA 5.5 5.0 - 8.0    Protein,  Negative mg/dL    Urobilinogen, Urine 0.2 <2.0 E.U./dL    Nitrite, Urine Negative Negative    Leukocyte Esterase, Urine Negative Negative   Microscopic Urinalysis    Collection Time: 09/01/21 12:28 PM   Result Value Ref Range    Bacteria, UA NEGATIVE (A) None Seen /HPF    Crystals, UA NEG (A) None Seen /HPF    Hyaline Casts, UA 7 0 - 8 /HPF    WBC, UA 1 0 - 5 /HPF    RBC, UA 4 0 - 4 /HPF    Epithelial Cells, UA 1 0 - 5 /HPF   BLOOD GAS, ARTERIAL    Collection Time: 09/01/21  1:32 PM   Result Value Ref Range    pH, Arterial 7.130 (LL) 7.350 - 7.450    pCO2, Arterial 48.0 (H) 35.0 - 45.0 mmHg    pO2, Arterial 55.0 (L) 80.0 - 100.0 mmHg    HCO3, Arterial 16.0 (L) 22.0 - 26.0 mmol/L    Base Excess, Arterial -13.0 (L) -2.0 - 2.0 mmol/L    Hemoglobin, Art, Extended 13.0 12.0 - 16.0 g/dL    O2 Sat, Arterial 81.3 (LL) >92 %    Carboxyhgb, Arterial 2.2 0.0 - 5.0 %    Methemoglobin, Arterial 1.0 <1.5 %    O2 Content, Arterial 14.9 Not Established mL/dL    O2 Therapy Unknown    Potassium, Whole Blood    Collection Time: 09/01/21  1:32 PM   Result Value Ref Range    Potassium, Whole Blood 4.4    POCT Glucose    Collection Time: 09/01/21  4:20 PM   Result Value Ref Range    POC Glucose 163 (H) 70 - 99 mg/dl    Performed on [employer]        No intake/output data recorded. XR CHEST PORTABLE    Result Date: 9/1/2021  . Worsening diffuse bilateral pneumonia. Signed by Dr Santiago Santana    Result Date: 9/1/2021  1. Endotracheal tube is satisfactorily positioned. 2. Extensive bilateral airspace filling infiltrates consistent with pneumonia. Signed by Dr Lestine Cushing    XR CHEST PORTABLE    Result Date: 9/1/2021  1. Diffuse infiltrate throughout both lungs which pneumonia is favored over edema.  Signed by Dr Deepa Bro and Plan:   COVID-19 bilateral pneumonia/severe ARDS  Prone ventilation  Dexamethasone  Tocilizumab  Adjuvant therapy  Conservative fluid management  Paralytics as warranted  Unvaccinated    Ventilator dependent acute hypoxemic respiratory failure  Secondary to above  Lung protective ventilation  Titrate minute ventilation for pH 7.35  Follow ABG/CXR  Follow plateau pressure    Cardiac arrest  Suspect secondary to primary process  TTE  Further imaging as warranted    Concern for DKA  DKA protocol    Morbid obesity    DVT prophylaxis  Lovenox    Total critical care time: 91 minutes  Total time spent managing the care of this patient: 91 minutes    Kim Knight MD  9/1/2021 8:29 PM

## 2021-09-02 NOTE — PROGRESS NOTES
Comprehensive Nutrition Assessment    Type and Reason for Visit:  Initial    Nutrition Recommendations/Plan: If with in clinicial course suggest 5 Proteinex in 24 hours    Nutrition Assessment:  Following for vent. Pt appears adequately nourished AEB fat and muscle mass. However pt is at risk for nutritional compromise d/t NPO status, on vent. If EN is desisred suggest just giving Proteinex as Propofol level is high. Malnutrition Assessment:  Malnutrition Status: At risk for malnutrition (Comment)    Context:  Acute Illness     Findings of the 6 clinical characteristics of malnutrition:  Energy Intake:  Mild decrease in energy intake (Comment)  Weight Loss:  No significant weight loss     Body Fat Loss:  No significant body fat loss     Muscle Mass Loss:  No significant muscle mass loss    Fluid Accumulation:  7 - Moderate to Severe Extremities   Strength:  Not Performed    Estimated Daily Nutrient Needs:  Energy (kcal):  9066-1685 kcals (20-25 kcals/IBW);  Weight Used for Energy Requirements:  Ideal     Protein (g):  130g; Weight Used for Protein Requirements:  Ideal        Fluid (ml/day):  3857-9346 ml; Method Used for Fluid Requirements:  1 ml/kcal      Nutrition Related Findings:  +1 edema, on vent      Wounds:  None       Current Nutrition Therapies:    Current Tube Feeding (TF) Orders:  · Feeding Route: Orogastric  · Formula:   Proteinex  · Schedule:    · Additives/Modulars:    · Water Flushes: 30ml/hr  · Current TF & Flush Orders Provides:    · Goal TF & Flush Orders Provides: Proteinex 5 bottles given at tf flush in 24 hours      Anthropometric Measures:  · Height: 5' 5\" (165.1 cm)  · Current Body Weight: 324 lb (147 kg)   · Admission Body Weight: 300 lb (136.1 kg) (stated)    · Usual Body Weight: 300 lb (136.1 kg)     · Ideal Body Weight: 125 lbs; % Ideal Body Weight 259.2 %   · BMI: 53.9  · Adjusted Body Weight:  ; No Adjustment   · BMI Categories: Obese Class 3 (BMI 40.0 or greater) Nutrition Diagnosis:   · Inadequate oral intake related to acute injury/trauma, impaired respiratory function as evidenced by NPO or clear liquid status due to medical condition, intubation, nutrition support - enteral nutrition      Nutrition Interventions:   Food and/or Nutrient Delivery:  Continue NPO  Nutrition Education/Counseling:  Education not indicated   Coordination of Nutrition Care:  Continue to monitor while inpatient    Goals:  Meet nutritional needs through alternate source of nutrition       Nutrition Monitoring and Evaluation:   Behavioral-Environmental Outcomes:  None Identified   Food/Nutrient Intake Outcomes:     Physical Signs/Symptoms Outcomes:  Biochemical Data, Weight, Skin, Nutrition Focused Physical Findings, Fluid Status or Edema     Discharge Planning:     Too soon to determine     Electronically signed by Mary Ace MS, RD, LD on 9/2/21 at 1:23 PM CDT    Contact: [phone]

## 2021-09-03 NOTE — PROCEDURES
Marie Aguilar is a 64 y.o. female patient. Insert I/O    Date/Time: 9/1/2021 9:15 PM  Performed by: Tawnya Land MD  Authorized by: Tawnya Land MD   Consent: The procedure was performed in an emergent situation. Patient identity confirmed: arm band and hospital-assigned identification number  Time out: Immediately prior to procedure a \"time out\" was called to verify the correct patient, procedure, equipment, support staff and site/side marked as required.   Indications: clinical deterioration and rapid vascular access  Insertion site: right proximal tibia  Site preparation: chlorhexidine  Insertion device: 15 gauge IO needle  Insertion: needle was inserted through the bony cortex  Number of attempts: 1  Confirmation method: stability of the needle, easy infusion of fluids and aspiration of blood/marrow  Secured with: transparent dressing  Patient tolerance: patient tolerated the procedure well with no immediate complications  Comments: EBL: 1 cc      Tawnya Land MD  9/3/2021

## 2021-09-03 NOTE — CONSULTS
Comprehensive Nutrition Assessment    Type and Reason for Visit:  Reassess    Nutrition Recommendations/Plan: start Vital High Protein and Proteinex    Nutrition Assessment:  Remains on vent. Received consult to start EN. To start Vital High Protein at 9ml/hr  with 4 Proteinex as tf flush    Malnutrition Assessment:  Malnutrition Status: At risk for malnutrition (Comment)    Context:  Acute Illness     Findings of the 6 clinical characteristics of malnutrition:  Energy Intake:  Mild decrease in energy intake (Comment)  Weight Loss:  No significant weight loss     Body Fat Loss:  No significant body fat loss     Muscle Mass Loss:  No significant muscle mass loss    Fluid Accumulation:  7 - Moderate to Severe Generalized   Strength:  Not Performed    Estimated Daily Nutrient Needs:  Energy (kcal):  7627-4757 kcals (20-25 kcals/IBW); Weight Used for Energy Requirements:  Ideal     Protein (g):  130g; Weight Used for Protein Requirements:  Ideal        Fluid (ml/day):  3842-1584 ml; Method Used for Fluid Requirements:  1 ml/kcal      Nutrition Related Findings:  +1+2 generalized edema      Wounds:  None       Current Nutrition Therapies:    Current Tube Feeding (TF) Orders:  · Feeding Route: Orogastric  · Formula: Peptide Based High Protein  · Schedule: Continuous  · Additives/Modulars: Protein  · Water Flushes: 25ml/hr  · Current TF & Flush Orders Provides: 0  · Goal TF & Flush Orders Provides: Vital High Protein with 4 Proteinex and 25ml/hr flush = 632 kcals with 122g protein, 24g CHO, 180 ml free water from formula and 600ml free water flush.   Propofol adds another 805 NP kcals      Anthropometric Measures:  · Height: 5' 5\" (165.1 cm)  · Current Body Weight: 326 lb (147.9 kg)   · Admission Body Weight: 300 lb (136.1 kg) (stated)    · Usual Body Weight: 300 lb (136.1 kg)     · Ideal Body Weight: 125 lbs; % Ideal Body Weight 260.8 %   · BMI: 54.2  · Adjusted Body Weight:  ; No Adjustment   · Adjusted BMI: · BMI Categories: Obese Class 3 (BMI 40.0 or greater)       Nutrition Diagnosis:   · Inadequate oral intake related to impaired respiratory function, acute injury/trauma as evidenced by NPO or clear liquid status due to medical condition, intubation, nutrition support - enteral nutrition      Nutrition Interventions:   Food and/or Nutrient Delivery:  Continue NPO, Start Tube Feeding  Nutrition Education/Counseling:  No recommendation at this time   Coordination of Nutrition Care:  Continue to monitor while inpatient    Goals:  Meet nutritional needs through alternate source of nutrition       Nutrition Monitoring and Evaluation:   Behavioral-Environmental Outcomes:  None Identified   Food/Nutrient Intake Outcomes:  Enteral Nutrition Intake/Tolerance  Physical Signs/Symptoms Outcomes:  Biochemical Data, Weight, Skin, Nutrition Focused Physical Findings, Fluid Status or Edema     Discharge Planning:     Too soon to determine     Electronically signed by Cheryl Austin MS, RD, LD on 9/3/21 at 12:29 PM CDT    Contact: [phone]

## 2021-09-03 NOTE — PROGRESS NOTES
Hospitalist Progress Note  [facility]     Patient: Augustus Swann  : 1960  MRN: [id number]  Code Status: Full Code    Hospital Day: 2   Date of Service: 9/3/2021    Subjective:   Patient seen in [place] [address] critical care unit. Intubated and sedated. Past Medical History:   Diagnosis Date    Age related osteoporosis     Diabetes ([address]     HTN (hypertension)     Palliative care patient 2021       No past surgical history on file. No family history on file. Social History     Socioeconomic History    Marital status:      Spouse name: Not on file    Number of children: Not on file    Years of education: Not on file    Highest education level: Not on file   Occupational History    Not on file   Tobacco Use    Smoking status: Not on file   Substance and Sexual Activity    Alcohol use: Not on file    Drug use: Not on file    Sexual activity: Not on file   Other Topics Concern    Not on file   Social History Narrative    Not on file     Social Determinants of Health     Financial Resource Strain:     Difficulty of Paying Living Expenses:    Food Insecurity:     Worried About Running Out of Food in the Last Year:     [address] in the Last Year:    Transportation Needs:     Lack of Transportation (Medical):      Lack of Transportation (Non-Medical):    Physical Activity:     Days of Exercise per Week:     Minutes of Exercise per Session:    Stress:     Feeling of Stress :    Social Connections:     Frequency of Communication with Friends and Family:     Frequency of Social Gatherings with Friends and Family:     Attends [employer] Services:     Active Member of Clubs or Organizations:     Attends Club or Organization Meetings:     Marital Status:    Intimate Partner Violence:     Fear of Current or Ex-Partner:     Emotionally Abused:     Physically Abused:     Sexually Abused:        Current Facility-Administered Medications   Medication Dose Route Frequency Provider Last Rate Last Admin    insulin lispro (HUMALOG) injection vial 0-12 Units  0-12 Units SubCUTAneous TID WC Chucho Valladares MD        insulin lispro (HUMALOG) injection vial 0-6 Units  0-6 Units SubCUTAneous Nightly Chucho Valladares MD   1 Units at 09/02/21 2043    fentanyl (SUBLIMAZE) infusion 10 mcg/mL  12.5-200 mcg/hr IntraVENous Continuous Leta Agudelo MD 20 mL/hr at 09/03/21 0559 200 mcg/hr at 09/03/21 0559    acetaminophen (TYLENOL) tablet 650 mg  650 mg Oral Q6H PRN Fadumo St MD        enoxaparin (LOVENOX) injection 30 mg  30 mg SubCUTAneous BID Fadumo St MD   30 mg at 09/03/21 0730    famotidine (PEPCID) injection 20 mg  20 mg IntraVENous BID Fadumo St MD   20 mg at 09/03/21 0730    guaiFENesin tablet 400 mg  400 mg Oral Q8H Fadumo St MD   400 mg at 09/03/21 0346    melatonin disintegrating tablet 10 mg  10 mg Oral Nightly Fadumo St MD   10 mg at 09/02/21 1950    Vitamin D (CHOLECALCIFEROL) tablet 2,000 Units  2,000 Units Oral Daily Fadumo St MD   2,000 Units at 09/03/21 0730    zinc sulfate (ZINCATE) capsule 50 mg  50 mg Oral Daily Fadumo St MD   50 mg at 09/03/21 0730    dextrose 50 % IV solution  12.5 g IntraVENous PRN Fadumo St MD        potassium chloride 10 mEq/100 mL IVPB (Peripheral Line)  10 mEq IntraVENous PRSTARLA St MD        magnesium sulfate 1000 mg in dextrose 5% 100 mL IVPB  1,000 mg IntraVENous CHEN St MD        sodium phosphate 10 mmol in dextrose 5 % 250 mL IVPB  10 mmol IntraVENous CHEN St MD        Or    sodium phosphate 15 mmol in dextrose 5 % 250 mL IVPB  15 mmol IntraVENous CHEN St MD        Or    sodium phosphate 20 mmol in dextrose 5 % 500 mL IVPB  20 mmol IntraVENous CHEN St MD        dextrose 5 % and 0.45 % sodium chloride infusion   IntraVENous Continuous PRN Fadumo St MD        vecuronium (NORCURON) injection 10 mg  10 mg IntraVENous Once Rene Barksdale MD        midazolam (VERSED) infusion 100mg/100mL  1-10 mg/hr IntraVENous Continuous Rene Barksdale MD 6 mL/hr at 09/03/21 0926 6 mg/hr at 09/03/21 0926    dexamethasone (PF) (DECADRON) injection 6 mg  6 mg IntraVENous Q12H Delilah Morales MD   6 mg at 09/02/21 2336    propofol injection  5-50 mcg/kg/min IntraVENous Titrated Delilah Morales MD 30.5 mL/hr at 09/03/21 1143 35 mcg/kg/min at 09/03/21 1143    labetalol (NORMODYNE;TRANDATE) injection 10 mg  10 mg IntraVENous Q4H PRN Delilah Morales MD   10 mg at 09/01/21 1728    vecuronium (NORCURON) injection 20 mg  20 mg IntraVENous Q4H PRN Alli Nichols MD   20 mg at 09/02/21 2152    lidocaine PF 1 % injection 5 mL  5 mL IntraDERmal Once Delilah Morales MD        sodium chloride flush 0.9 % injection 5-40 mL  5-40 mL IntraVENous 2 times per day Delilah Morales MD   10 mL at 09/03/21 0731    sodium chloride flush 0.9 % injection 5-40 mL  5-40 mL IntraVENous PRN Delilah Morales MD        0.9 % sodium chloride infusion  25 mL IntraVENous PRN Delilah Morales MD             fentaNYL 200 mcg/hr (09/03/21 0559)    dextrose 5 % and 0.45 % NaCl      midazolam 6 mg/hr (09/03/21 0926)    propofol 35 mcg/kg/min (09/03/21 1143)    sodium chloride          Objective:   /70   Pulse 101   Temp 97.3 °F (36.3 °C) (Temporal)   Resp 22   Ht 5' 5\" (1.651 m)   Wt (!) 326 lb (147.9 kg)   SpO2 92%   BMI 54.25 kg/m²     In an effort to reduce COVID-19 exposure, patient seen from doorway and case discussed in detail with unit staff    Recent Labs     09/01/21  1142 09/02/21  1540 09/03/21  0320   WBC 8.3 10.6 11.1*   RBC 4.66 4.76 4.87   HGB 12.4 13.0 12.9   HCT 42.9 44.2 45.0   MCV 92.1 92.9 92.4   MCH 26.6* 27.3 26.5*   MCHC 28.9* 29.4* 28.7*    168 160     Recent Labs     09/01/21  1142 09/01/21  1151 09/01/21  1332 09/02/21  0030 09/02/21  0032 09/03/21  0320 09/03/21  0653   *  --   --  133*  --  143  --    K 4.2   < >   < > 4.7 4.3 4.7 4.6   ANIONGAP 24*  --   --  19  --  15  --    CL 97*  --   --  102  --  108  --    CO2 12*  --   --  12*  --  20*  --    BUN 12  --   --  20  --  21*  --    CREATININE 0.9  --   --  1.0*  --  0.8  --    GLUCOSE 198*  --   --  206*  --  215*  --    CALCIUM 8.3*  --   --  7.9*  --  8.4*  --     < > = values in this interval not displayed. Recent Labs     09/03/21  0320   MG 2.6     Recent Labs     09/01/21  1142 09/02/21  0030 09/03/21  0320   * 237* 215*   ALT 37* 57* 73*   BILITOT 0.5 0.4 0.4   ALKPHOS 132* 137* 134*     No results for input(s): PH, PO2, PCO2, HCO3, BE, O2SAT in the last 72 hours. Recent Labs     09/01/21  1142   TROPONINI 0.36*     Recent Labs     09/01/21  1142 09/02/21  1540 09/03/21  0320   INR 1.19* 0.97 1.03     Recent Labs     09/01/21  1142   LACTA 4.2*         Intake/Output Summary (Last 24 hours) at 9/3/2021 1144  Last data filed at 9/3/2021 0800  Gross per 24 hour   Intake 1279.7 ml   Output 2650 ml   Net -1370.3 ml       ECHO Complete 2D W Doppler W Color    Result Date: 9/2/2021  Transthoracic Echocardiography Report (TTE)  Demographics   Patient Name Warden Bloom  Date of Study         09/02/2021               Gisselle Au   MRN          [id number]           Gender                Female   Date of      1960       Room Number           [facility]  Birth   Age          64 year(s)   Height:      63 inches        Referring Physician   Spenser Carter   Weight:      320.01 pounds    Sonographer           Susanne Bruno, [facility]   BSA:         2.36 m^2         Interpreting          Daron Peña                                Physician   BMI:         56.69 kg/m^2  Procedure Type of Study   TTE procedure:ECHO 2D W/DOPPLER/COLOR/CONTRAST. Study Location: Portable Technical Quality: Poor visualization due to body habitus. Patient Status: Inpatient Contrast Medium: Definity.  Amount - 8 ml Indications:Cardiac arrest.  Conclusions   Summary  Normal left ventricular size with severely reduced LV systolic function  and an estimated ejection fraction of approximately 25-30%. Moderate  global hypokinesis with relative sparing of the basal inferoseptal wall  and severe anteroseptal, septal and anterior wall hypokinesis. Normal left  ventricular wall thickness. No evidence of left ventricular mass or  thrombus noted. Diastolic parameters not assessed. Normal right ventricular size with reduced RV systolic function. No significant valve stenosis or regurgitation. Aortic root not well visualized. No evidence of significant pericardial effusion is noted. ABNORMAL study. No previous studies available for comparison. Signature   ----------------------------------------------------------------  Electronically signed by Johny OdomInterpreting physician)  on 09/02/2021 06:22 PM  ----------------------------------------------------------------   Findings   Mitral Valve  Structurally normal mitral valve with normal leaflet mobility. No evidence  of mitral valve stenosis or mitral regurgitation. Aortic Valve  Aortic valve is not well visualized. No significant aortic regurgitation or stenosis is noted. Tricuspid Valve  Tricuspid valve is structurally normal.  No evidence of tricuspid regurgitation. Cannot estimate RVSP. Pulmonic Valve  The pulmonic valve was not well visualized. Left Atrium  Grossly normal size left atrium. Left Ventricle  Normal left ventricular size with severely reduced LV systolic function  and an estimated ejection fraction of approximately 25-30%. Moderate  global hypokinesis with relative sparing of the basal inferoseptal wall  and severe anteroseptal, septal and anterior wall hypokinesis. Normal left ventricular wall thickness. No evidence of left ventricular mass or thrombus noted. Diastolic parameters not assessed. Right Atrium  Normal right atrial dimension.    Right Ventricle  Normal right ventricular size with reduced RV systolic function. Pericardial Effusion  No evidence of significant pericardial effusion is noted. Pleural Effusion  No evidence of pleural effusion. Miscellaneous  Aortic root not well visualized. IVC not visualized. M-Mode Measurements (cm)   LVIDd: 4.23 cm                                  LVIDs: 3.39 cm  IVSd: 1 cm  LVPWd: 0.98 cm  % Ejection Fraction: 40 %      XR CHEST PORTABLE    Result Date: 9/2/2021  XR CHEST PORTABLE 9/2/2021 2:26 PM History: Covid pneumonia. Diabetes. Hypertension. Portable chest x-ray compared with yesterday 5:47 PM. Diffuse mixed infiltrate is again seen throughout both lungs though there has been partial clearing compared with yesterday. The endotracheal tube and nasogastric tube remain in good position. The endotracheal tube tip lies 51 mm above the gilda. There is no pneumothorax. 1. Diffuse bilateral pneumonia with mild improvement compared with yesterday. 2. Stable tubes. Signed by Dr Tate Menon    XR CHEST PORTABLE    Result Date: 9/1/2021  EXAMINATION: Chest one view 9/1/2021 HISTORY: Covid FINDINGS: Today's exam is compared to previous study of earlier the same day. There is significant progression in the patient's bilateral pneumonia when compared to the previous exam of earlier the same day. An NG tube has been advanced into the stomach. An endotracheal tube remains well-positioned. . Worsening diffuse bilateral pneumonia. Signed by Dr Corrinne Milo    Result Date: 9/1/2021  EXAMINATION: XR CHEST PORTABLE 9/1/2021 1:43 PM HISTORY: XR CHEST PORTABLE 9/1/2021 12:08 PM HISTORY: Cough COMPARISON: Prior exam the same date 11:44 AM. FINDINGS: Extensive bilateral airspace infiltrates are present. This most consistent with pneumonia. . Cardiac silhouettes upper limits of normal. Endotracheal tube is present. . The osseous structures and surrounding soft tissues demonstrate no acute abnormality. 1. Endotracheal tube is satisfactorily positioned.  2. Extensive bilateral airspace filling infiltrates consistent with pneumonia. Signed by Dr Stefania Varner    XR CHEST PORTABLE    Result Date: 9/1/2021  XR CHEST PORTABLE 9/1/2021 12:58 PM History: Shortness of breath. Diffuse dense alveolar infiltrate throughout both lungs. Heart size appears to be within normal limits. No pneumothorax. 1. Diffuse infiltrate throughout both lungs which pneumonia is favored over edema.  Signed by Dr Kapil Dangelo and Plan:   COVID-19 bilateral pneumonia/severe ARDS  Prone ventilation  Dexamethasone  Tocilizumab  Adjuvant therapy  Conservative fluid management  Paralytics as warranted  Unvaccinated     Ventilator dependent acute hypoxemic respiratory failure  Secondary to above  Lung protective ventilation  Titrate minute ventilation for pH 7.35  Follow ABG/CXR  Follow plateau pressure     Cardiac arrest  Suspect secondary to primary process  TTE reviewed     Concern for DKA  Improved with DKA protocol     Morbid obesity     DVT prophylaxis  Lovenox    Total critical care time: 51 minutes    Lary Lennox, MD   9/3/2021 11:44 AM

## 2021-09-03 NOTE — PROGRESS NOTES
pH, Arterial 7.280Low Panic   7.350 - 7.450 Final 09/03/2021  6:53 AM [facility]   pCO2, Arterial 47. 0High   35.0 - 45.0 mmHg Final 09/03/2021  6:53 AM [facility]   pO2, Arterial 65. 0Low   80.0 - 100.0 mmHg Final 09/03/2021  6:53 AM [facility]   HCO3, Arterial 22.1  22.0 - 26.0 mmol/L Final 09/03/2021  6:53 AM [facility] Excess, Arterial -4.8Low   -2.0 - 2.0 mmol/L Final 09/03/2021  6:53 AM [facility]   Hemoglobin, Art, Extended 13.4  12.0 - 16.0 g/dL Final 09/03/2021  6:53 AM [facility]   O2 Sat, Arterial 91.4  >92 % Final 09/03/2021  6:53 AM [facility]   Carboxyhgb, Arterial 1.2  0.0 - 5.0 % Final 09/03/2021  6:53 AM [facility]        0.0-1.5   (Smokers 1.5-5.0)    Methemoglobin, Arterial 0.8  <1.5 % Final 09/03/2021  6:53 AM [facility]   O2 Content, Arterial 17.2  Not Established mL/dL Final 09/03/2021  6:53 AM [facility]     AC/VC 24, 500, 100%, +12 PEEP. ABG's drawn from RR while patient in prone position.

## 2021-09-03 NOTE — PROGRESS NOTES
IO in use due to lack of access. PICC team unable to see patient today.     Electronically signed by Tram Molina RN on 9/2/2021 at 11:02 PM

## 2021-09-03 NOTE — PROCEDURES
[facility] Vascular Access Team:  PICC Line Insertion Procedure Note      Procedure: Insertion of 5.5 [place] Double Lumen PICC    Indications:    Long Term IV therapy, Poor Access    Procedure Details:  Informed consent was obtained for the procedure, including sedation. Risks of lung perforation, hemorrhage, and adverse drug reaction were discussed. 5.5 [place] Double Lumen PICC inserted to the R Basilic vein per hospital protocol. Blood return: yes    Findings:  Catheter inserted to 47 cm, with 0 cm exposed. There were no changes to vital signs. Catheter was flushed with 20 cc NS. Patient did tolerate procedure well. Tip location confirmed within the SVC by VPS technology. Recommendations:  PICC Line is ready to be used. Please change tubing prior to using new PICC line. Make sure to label, date and use alcohol caps on new tubing and alcohol caps on unused ports.

## 2021-09-03 NOTE — PLAN OF CARE
Nutrition Problem #1: Inadequate oral intake  Intervention: Food and/or Nutrient Delivery: Continue NPO, Start Tube Feeding  Nutritional Goals: Meet nutritional needs through alternate source of nutrition

## 2021-09-04 NOTE — DISCHARGE SUMMARY
Hattie Villalobos  :  1960  MRN:  [id number]    Admit date:  2021  Discharge date:      Discharging Physician: Dr. Josias Flores Directive: DNR-CC    Consults: Eloy Brennan 148     Primary Care Physician:  No primary care provider on file. Discharge Diagnoses: Active Problems:    Pneumonia due to COVID-19 virus    Palliative care patient  Resolved Problems:    * No resolved hospital problems. *      Portions of this note have been copied forward, however, changed to reflect the most current clinical status of this patient. Hospital Course:   Patient admitted to the ICU for COVID-19 after presenting to the ER with shortness of breath on 2021. Patient reported been sick for several days prior to arrival to ER and EMS found her initial oxygen saturation of 37% on room air. Throughout patient's hospitalization she showed gradual decline secondary to COVID-19 infection. Patient required full support on ventilator however her oxygen saturations could not be maintained at a normal level. On 2021 at 1500 patient succumbed to COVID-19 and passed away having no respiratory rate no heart rate and low blood pressure. Significant Diagnostic Studies:   ECHO Complete 2D W Doppler W Color    Result Date: 2021  Transthoracic Echocardiography Report (TTE)  Demographics   Patient Name Charly Armenta  Date of Study         2021               Lilia Chung   MRN          [id number]           Gender                Female   Date of      1960       Room Number           [facility]0705  Birth   Age          64 year(s)   Height:      63 inches        Referring Physician   Ortiz Ashby   Weight:      320.01 pounds    Sonographer           Susanne Bruno [facility]   BSA:         2.36 m^2         Interpreting          Matt Rangel                                Physician   BMI:         56.69 kg/m^2  Procedure Type of Study   TTE procedure:ECHO 2D W/DOPPLER/COLOR/CONTRAST.   Study Location: Portable Technical Quality: Poor visualization due to body habitus. Patient Status: Inpatient Contrast Medium: Definity. Amount - 8 ml Indications:Cardiac arrest.  Conclusions   Summary  Normal left ventricular size with severely reduced LV systolic function  and an estimated ejection fraction of approximately 25-30%. Moderate  global hypokinesis with relative sparing of the basal inferoseptal wall  and severe anteroseptal, septal and anterior wall hypokinesis. Normal left  ventricular wall thickness. No evidence of left ventricular mass or  thrombus noted. Diastolic parameters not assessed. Normal right ventricular size with reduced RV systolic function. No significant valve stenosis or regurgitation. Aortic root not well visualized. No evidence of significant pericardial effusion is noted. ABNORMAL study. No previous studies available for comparison. Signature   ----------------------------------------------------------------  Electronically signed by Stephie Palma(Interpreting physician)  on 09/02/2021 06:22 PM  ----------------------------------------------------------------   Findings   Mitral Valve  Structurally normal mitral valve with normal leaflet mobility. No evidence  of mitral valve stenosis or mitral regurgitation. Aortic Valve  Aortic valve is not well visualized. No significant aortic regurgitation or stenosis is noted. Tricuspid Valve  Tricuspid valve is structurally normal.  No evidence of tricuspid regurgitation. Cannot estimate RVSP. Pulmonic Valve  The pulmonic valve was not well visualized. Left Atrium  Grossly normal size left atrium. Left Ventricle  Normal left ventricular size with severely reduced LV systolic function  and an estimated ejection fraction of approximately 25-30%. Moderate  global hypokinesis with relative sparing of the basal inferoseptal wall  and severe anteroseptal, septal and anterior wall hypokinesis. Normal left ventricular wall thickness.   No evidence of left ventricular mass or thrombus noted. Diastolic parameters not assessed. Right Atrium  Normal right atrial dimension. Right Ventricle  Normal right ventricular size with reduced RV systolic function. Pericardial Effusion  No evidence of significant pericardial effusion is noted. Pleural Effusion  No evidence of pleural effusion. Miscellaneous  Aortic root not well visualized. IVC not visualized. M-Mode Measurements (cm)   LVIDd: 4.23 cm                                  LVIDs: 3.39 cm  IVSd: 1 cm  LVPWd: 0.98 cm  % Ejection Fraction: 40 %      XR CHEST PORTABLE    Result Date: 9/2/2021  XR CHEST PORTABLE 9/2/2021 2:26 PM History: Covid pneumonia. Diabetes. Hypertension. Portable chest x-ray compared with yesterday 5:47 PM. Diffuse mixed infiltrate is again seen throughout both lungs though there has been partial clearing compared with yesterday. The endotracheal tube and nasogastric tube remain in good position. The endotracheal tube tip lies 51 mm above the gilda. There is no pneumothorax. 1. Diffuse bilateral pneumonia with mild improvement compared with yesterday. 2. Stable tubes. Signed by Dr Rosario Richter    XR CHEST PORTABLE    Result Date: 9/1/2021  EXAMINATION: Chest one view 9/1/2021 HISTORY: Covid FINDINGS: Today's exam is compared to previous study of earlier the same day. There is significant progression in the patient's bilateral pneumonia when compared to the previous exam of earlier the same day. An NG tube has been advanced into the stomach. An endotracheal tube remains well-positioned. . Worsening diffuse bilateral pneumonia. Signed by Dr Faye Locke    Result Date: 9/1/2021  EXAMINATION: XR CHEST PORTABLE 9/1/2021 1:43 PM HISTORY: XR CHEST PORTABLE 9/1/2021 12:08 PM HISTORY: Cough COMPARISON: Prior exam the same date 11:44 AM. FINDINGS: Extensive bilateral airspace infiltrates are present. This most consistent with pneumonia. . Cardiac silhouettes upper limits of normal. Endotracheal tube is present. . The osseous structures and surrounding soft tissues demonstrate no acute abnormality. 1. Endotracheal tube is satisfactorily positioned. 2. Extensive bilateral airspace filling infiltrates consistent with pneumonia. Signed by Dr Darío Nesbitt    XR CHEST PORTABLE    Result Date: 2021  XR CHEST PORTABLE 2021 12:58 PM History: Shortness of breath. Diffuse dense alveolar infiltrate throughout both lungs. Heart size appears to be within normal limits. No pneumothorax. 1. Diffuse infiltrate throughout both lungs which pneumonia is favored over edema. Signed by Dr Ros Dia      Pertinent Labs:   CBC:   Recent Labs     21  1540 21  0320 21  0450   WBC 10.6 11.1* 13.0*   HGB 13.0 12.9 13.2    160 158     BMP:    Recent Labs     21  0030 21  0032 21  0320 21  0653 21  0413 21  0450   *  --  143  --   --  143   K 4.7   < > 4.7 4.6 4.8 5.0     --  108  --   --  110   CO2 12*  --  20*  --   --  21*   BUN 20  --  21*  --   --  26*   CREATININE 1.0*  --  0.8  --   --  0.8   GLUCOSE 206*  --  215*  --   --  282*    < > = values in this interval not displayed. INR:   Recent Labs     21  1540 21  0320 21  0450   INR 0.97 1.03 1.10       Physical Exam:  Vital Signs: /70   Pulse 125   Temp 97.8 °F (36.6 °C) (Axillary)   Resp 28   Ht 5' 5\" (1.651 m)   Wt (!) 326 lb (147.9 kg)   SpO2 (!) 68%   BMI 54.25 kg/m²   Physical Exam  Deferred      Discharge Instructions:   Patient     Diet:   Patient     Disposition: Patient     Time spent on discharge 35 minutes spent in assessing patient, reviewing medications, discussion with nursing, confirming safe discharge plan and preparation of discharge summary.     Signed:  NAILA Sandoval CNP, 2021 3:27 PM

## 2021-09-04 NOTE — PROGRESS NOTES
9/4/2021  4:14 AM - Aimee Espitia Incoming Lab Results From [facility] Value Ref Range & Units Status Collected Lab   pH, Arterial 7.290Low Panic   7.350 - 7.450 Final 09/04/2021  4:13 AM [facility]   pCO2, Arterial 51. 0High   35.0 - 45.0 mmHg Final 09/04/2021  4:13 AM [facility] Lab   pO2, Arterial 50. 0Low Panic   80.0 - 100.0 mmHg Final 09/04/2021  4:13 AM [facility] Lab   HCO3, Arterial 24.5  22.0 - 26.0 mmol/L Final 09/04/2021  4:13 AM [facility] Excess, Arterial -2.7Low   -2.0 - 2.0 mmol/L Final 09/04/2021  4:13 AM [facility] Lab   Hemoglobin, Art, Extended 14.0  12.0 - 16.0 g/dL Final 09/04/2021  4:13 AM [facility]   O2 Sat, Arterial 83.5Low Panic   >92 % Final 09/04/2021  4:13 AM [facility]   Carboxyhgb, Arterial 1.5  0.0 - 5.0 % Final 09/04/2021  4:13 AM [facility]        0.0-1.5   (Smokers 1.5-5.0)    Methemoglobin, Arterial 0.9  <1.5 % Final 09/04/2021  4:13 AM [facility]   O2 Content, Arterial 16.4  Not Established mL/dL Final 09/04/2021  4:13 AM [facility]     PT ON VENT VC,24,450,100% + 14 PEEP  RIGHT RADIAL PUNCTURE AT+

## 2021-09-04 NOTE — PROGRESS NOTES
Patient sating 82-85%. Proned at 0200 with help from nursing staff and RT.  Patient currently sating 84%    Electronically signed by Chichi Grant RN on 9/4/2021 at 2:24 AM

## 2021-09-06 LAB
BLOOD CULTURE, ROUTINE: NORMAL
CULTURE, BLOOD 2: NORMAL

## 2021-09-08 NOTE — PROGRESS NOTES
Physician Progress Note      PATIENT:               Brianne Valadez  [facility] #:                  [id number]  :                       1960  ADMIT DATE:       2021 11:34 AM  DISCH DATE:        2021 5:40 PM  RESPONDING  PROVIDER #:        Socorro Bolden MD          QUERY TEXT:    Pt presented with profound shortness of breath, positive for COVID-19. Pt   noted to be mildly altered and in extremis. If possible, please document in   the progress notes and discharge summary if you are evaluating and / or   treating any of the following: The medical record reflects the following:  Risk Factors: COVID-19 pneumonia, acute hypoxic respiratory failure, acidosis  Clinical Indicators: slow to answer questions secondary to respiratory   distress, cannot really answer questions fully secondary to her extremitas; RR   41, SpO2 40%,  Treatment: sedation, intubation, mechanical ventilation    Thank you,  Arpita Ragsdale RN, CCDS  [phone]  Options provided:  -- Anoxic encephalopathy  -- Metabolic encephalopathy  -- Delirium due to, Please specify cause. -- Delirium  -- Other - I will add my own diagnosis  -- Disagree - Not applicable / Not valid  -- Disagree - Clinically unable to determine / Unknown  -- Refer to Clinical Documentation Reviewer    PROVIDER RESPONSE TEXT:    This patient has anoxic encephalopathy.     Query created by: Loly Villanueva on 2021 8:38 AM      Electronically signed by:  Socorro Bolden MD 2021 1:03 PM

## 2021-10-05 NOTE — CONSULTS
**Physician Signature**  This document was electronically signed by: Freddy Galvez MD  2021   11:08 AM    **Consult Information**  Member Facility: [address] [place] [address] Drive MRN: [id number]  Visit/Encounter Number: [id number]  Consult ID: [id number]  Facility Time Zone: CT  Date and Time of Request: 2021 08:01 AM  CT  Requesting Clinician: DR. SCHREIBER  Patient Name: Santo Paget  Date of Birth: [birth year]  Gender: Female  Patient identity was confirmed at the beginning of the consult with the   patient/family/staff using two personal identifiers: Patient name and       **Reason for Consult**  Reason for Consult: [facility]    **Admission**  Admission Date: 2021  Chief reason for ICU admission: COVID - 19  Secondary reasons for ICU admission: Respiratory Failure, Pneumonia    **Core Metrics**  General orienting sentence for patient: 63 yo female came to ER intubated   with Sats 32. Arrested in ICU, ROSC. Proned through the night Sats 92%   FiO2 1005 PEEP 14 so. SPO2 93%. No pressors. Versed, fentanyl despite cardiac   arrest. PICC   pending. Unvaccinated, BMI 54, diabetes. overnight despite proning. SPO2 81%   while prone, now supine and SPO2 high   70s. Family in place. 100% FIO2, PEEP 14. Tachycardic, paralyzed.  Remains   heavily sedated b/o paralytics  Chief physiologic deterioration: Increase in FiO2 required by   30%  Is the patient on DVT prophylaxis?: Yes  Prophylaxis type: Pharmacological  DVT Prophylaxis Comments: lovenox  Is the patient on GI prophylaxis?: Yes  Gi Prophylaxis Comments: Pepcid  Has this patient reached their nutritional goal?: No and issues are being   addressed  Nutritional Goals Details: Tube feeds started 9/3  Are there current issues with pain management in this patient?:   No  Are there issues with skin integrity?: No  Are there issues with delirium?: No  Has the patient been mobilized?: No  Is this patient currently intubated?: Yes  Has facility initiated vent bundle?: No  Intubation Details: Too sick for wean  Are there ethical or care philosophy or family issues?: No  Family Issues Details: Siblings providing drama, don't grasp severity of   situation. Spouse wants oldest daughter to be decision-maker. Dtr DOES   listen to staff.  Present at bedside 9/4  Do you recommend an in depth evaluation?: No  Disposition Recommendations: Continue ICU level of care    **Inserted/Removed Devices**  Device Name: Endotracheal Tube  Site of insertion: Trachea  Date of insertion: 09-  Device Name: Valerio Catheter  Site of insertion: Bladder  Date of insertion: 09-  Device Name: Orogastric Tube  Site of insertion: Other  Date of insertion: 09-  Device Name: PICC  Site of insertion: Right Upper Arm  Date of insertion: 09-    **Physician Signature**  This document was electronically signed by: Cori Cardenas MD  09/04/2021   11:08 AM
